# Patient Record
Sex: MALE | Race: BLACK OR AFRICAN AMERICAN | NOT HISPANIC OR LATINO | Employment: OTHER | URBAN - METROPOLITAN AREA
[De-identification: names, ages, dates, MRNs, and addresses within clinical notes are randomized per-mention and may not be internally consistent; named-entity substitution may affect disease eponyms.]

---

## 2019-07-08 PROCEDURE — 99285 EMERGENCY DEPT VISIT HI MDM: CPT

## 2019-07-08 PROCEDURE — 36415 COLL VENOUS BLD VENIPUNCTURE: CPT

## 2019-07-09 ENCOUNTER — APPOINTMENT (OUTPATIENT)
Dept: CT IMAGING | Facility: HOSPITAL | Age: 54
End: 2019-07-09

## 2019-07-09 ENCOUNTER — HOSPITAL ENCOUNTER (INPATIENT)
Facility: HOSPITAL | Age: 54
LOS: 7 days | Discharge: HOME | End: 2019-07-16
Attending: EMERGENCY MEDICINE | Admitting: SURGERY

## 2019-07-09 ENCOUNTER — ANESTHESIA EVENT (OUTPATIENT)
Dept: PERIOP | Facility: HOSPITAL | Age: 54
End: 2019-07-09

## 2019-07-09 ENCOUNTER — ANESTHESIA (OUTPATIENT)
Dept: PERIOP | Facility: HOSPITAL | Age: 54
End: 2019-07-09

## 2019-07-09 DIAGNOSIS — K43.6 INCARCERATED VENTRAL HERNIA: Primary | ICD-10-CM

## 2019-07-09 DIAGNOSIS — K56.609 SMALL BOWEL OBSTRUCTION (HCC): ICD-10-CM

## 2019-07-09 LAB
ALBUMIN SERPL-MCNC: 4.3 G/DL (ref 3.5–5.2)
ALBUMIN/GLOB SERPL: 1.1 G/DL
ALP SERPL-CCNC: 58 U/L (ref 39–117)
ALT SERPL W P-5'-P-CCNC: 19 U/L (ref 1–41)
ANION GAP SERPL CALCULATED.3IONS-SCNC: 15.2 MMOL/L (ref 5–15)
AST SERPL-CCNC: 23 U/L (ref 1–40)
BACTERIA UR QL AUTO: NORMAL /HPF
BASOPHILS # BLD AUTO: 0.04 10*3/MM3 (ref 0–0.2)
BASOPHILS NFR BLD AUTO: 0.3 % (ref 0–1.5)
BILIRUB SERPL-MCNC: 0.5 MG/DL (ref 0.2–1.2)
BILIRUB UR QL STRIP: NEGATIVE
BUN BLD-MCNC: 18 MG/DL (ref 6–20)
BUN/CREAT SERPL: 14 (ref 7–25)
CALCIUM SPEC-SCNC: 9.2 MG/DL (ref 8.6–10.5)
CHLORIDE SERPL-SCNC: 103 MMOL/L (ref 98–107)
CLARITY UR: CLEAR
CO2 SERPL-SCNC: 19.8 MMOL/L (ref 22–29)
COLOR UR: YELLOW
CREAT BLD-MCNC: 1.29 MG/DL (ref 0.76–1.27)
DEPRECATED RDW RBC AUTO: 42.1 FL (ref 37–54)
EOSINOPHIL # BLD AUTO: 0.13 10*3/MM3 (ref 0–0.4)
EOSINOPHIL NFR BLD AUTO: 1 % (ref 0.3–6.2)
ERYTHROCYTE [DISTWIDTH] IN BLOOD BY AUTOMATED COUNT: 14.7 % (ref 12.3–15.4)
GFR SERPL CREATININE-BSD FRML MDRD: 71 ML/MIN/1.73
GLOBULIN UR ELPH-MCNC: 3.9 GM/DL
GLUCOSE BLD-MCNC: 231 MG/DL (ref 65–99)
GLUCOSE BLDC GLUCOMTR-MCNC: 175 MG/DL (ref 70–130)
GLUCOSE BLDC GLUCOMTR-MCNC: 201 MG/DL (ref 70–130)
GLUCOSE BLDC GLUCOMTR-MCNC: 202 MG/DL (ref 70–130)
GLUCOSE BLDC GLUCOMTR-MCNC: 218 MG/DL (ref 70–130)
GLUCOSE BLDC GLUCOMTR-MCNC: 230 MG/DL (ref 70–130)
GLUCOSE BLDC GLUCOMTR-MCNC: 252 MG/DL (ref 70–130)
GLUCOSE UR STRIP-MCNC: ABNORMAL MG/DL
HCT VFR BLD AUTO: 38.8 % (ref 37.5–51)
HGB BLD-MCNC: 11.9 G/DL (ref 13–17.7)
HGB UR QL STRIP.AUTO: ABNORMAL
HYALINE CASTS UR QL AUTO: NORMAL /LPF
IMM GRANULOCYTES # BLD AUTO: 0.07 10*3/MM3 (ref 0–0.05)
IMM GRANULOCYTES NFR BLD AUTO: 0.5 % (ref 0–0.5)
KETONES UR QL STRIP: ABNORMAL
LEUKOCYTE ESTERASE UR QL STRIP.AUTO: ABNORMAL
LIPASE SERPL-CCNC: 24 U/L (ref 13–60)
LYMPHOCYTES # BLD AUTO: 2.45 10*3/MM3 (ref 0.7–3.1)
LYMPHOCYTES NFR BLD AUTO: 18.4 % (ref 19.6–45.3)
MCH RBC QN AUTO: 24.4 PG (ref 26.6–33)
MCHC RBC AUTO-ENTMCNC: 30.7 G/DL (ref 31.5–35.7)
MCV RBC AUTO: 79.7 FL (ref 79–97)
MONOCYTES # BLD AUTO: 0.94 10*3/MM3 (ref 0.1–0.9)
MONOCYTES NFR BLD AUTO: 7 % (ref 5–12)
NEUTROPHILS # BLD AUTO: 9.71 10*3/MM3 (ref 1.7–7)
NEUTROPHILS NFR BLD AUTO: 72.8 % (ref 42.7–76)
NITRITE UR QL STRIP: NEGATIVE
NRBC BLD AUTO-RTO: 0 /100 WBC (ref 0–0.2)
PH UR STRIP.AUTO: 5 [PH] (ref 5–8)
PLATELET # BLD AUTO: 275 10*3/MM3 (ref 140–450)
PMV BLD AUTO: 10.1 FL (ref 6–12)
POTASSIUM BLD-SCNC: 3.5 MMOL/L (ref 3.5–5.2)
PROT SERPL-MCNC: 8.2 G/DL (ref 6–8.5)
PROT UR QL STRIP: ABNORMAL
RBC # BLD AUTO: 4.87 10*6/MM3 (ref 4.14–5.8)
RBC # UR: NORMAL /HPF
REF LAB TEST METHOD: NORMAL
SODIUM BLD-SCNC: 138 MMOL/L (ref 136–145)
SP GR UR STRIP: 1.02 (ref 1–1.03)
SQUAMOUS #/AREA URNS HPF: NORMAL /HPF
UROBILINOGEN UR QL STRIP: ABNORMAL
WBC NRBC COR # BLD: 13.34 10*3/MM3 (ref 3.4–10.8)
WBC UR QL AUTO: NORMAL /HPF

## 2019-07-09 PROCEDURE — 88302 TISSUE EXAM BY PATHOLOGIST: CPT | Performed by: SURGERY

## 2019-07-09 PROCEDURE — 25010000002 CEFAZOLIN PER 500 MG: Performed by: SURGERY

## 2019-07-09 PROCEDURE — 25010000002 PROPOFOL 10 MG/ML EMULSION: Performed by: NURSE ANESTHETIST, CERTIFIED REGISTERED

## 2019-07-09 PROCEDURE — 93005 ELECTROCARDIOGRAM TRACING: CPT | Performed by: SURGERY

## 2019-07-09 PROCEDURE — 49561 PR REPAIR INCISIONAL HERNIA,STRANG: CPT | Performed by: SURGERY

## 2019-07-09 PROCEDURE — 25010000002 ONDANSETRON PER 1 MG: Performed by: EMERGENCY MEDICINE

## 2019-07-09 PROCEDURE — 82962 GLUCOSE BLOOD TEST: CPT

## 2019-07-09 PROCEDURE — 63710000001 INSULIN LISPRO (HUMAN) PER 5 UNITS: Performed by: SURGERY

## 2019-07-09 PROCEDURE — 25010000002 SUCCINYLCHOLINE PER 20 MG: Performed by: NURSE ANESTHETIST, CERTIFIED REGISTERED

## 2019-07-09 PROCEDURE — 74176 CT ABD & PELVIS W/O CONTRAST: CPT

## 2019-07-09 PROCEDURE — 63710000001 INSULIN GLARGINE PER 5 UNITS: Performed by: INTERNAL MEDICINE

## 2019-07-09 PROCEDURE — 25010000002 HYDROMORPHONE PER 4 MG: Performed by: SURGERY

## 2019-07-09 PROCEDURE — 25010000002 ALBUMIN HUMAN 5% PER 50 ML: Performed by: NURSE ANESTHETIST, CERTIFIED REGISTERED

## 2019-07-09 PROCEDURE — 25010000002 HYDROMORPHONE 1 MG/ML SOLUTION: Performed by: EMERGENCY MEDICINE

## 2019-07-09 PROCEDURE — 25010000002 PHENYLEPHRINE PER 1 ML: Performed by: NURSE ANESTHETIST, CERTIFIED REGISTERED

## 2019-07-09 PROCEDURE — 44120 REMOVAL OF SMALL INTESTINE: CPT | Performed by: SURGERY

## 2019-07-09 PROCEDURE — 49561 PR REPAIR INCISIONAL HERNIA,STRANG: CPT | Performed by: SPECIALIST/TECHNOLOGIST, OTHER

## 2019-07-09 PROCEDURE — 25010000002 FENTANYL CITRATE (PF) 100 MCG/2ML SOLUTION: Performed by: NURSE ANESTHETIST, CERTIFIED REGISTERED

## 2019-07-09 PROCEDURE — P9041 ALBUMIN (HUMAN),5%, 50ML: HCPCS | Performed by: NURSE ANESTHETIST, CERTIFIED REGISTERED

## 2019-07-09 PROCEDURE — 80053 COMPREHEN METABOLIC PANEL: CPT | Performed by: EMERGENCY MEDICINE

## 2019-07-09 PROCEDURE — 25010000002 ONDANSETRON PER 1 MG: Performed by: SURGERY

## 2019-07-09 PROCEDURE — 93010 ELECTROCARDIOGRAM REPORT: CPT | Performed by: INTERNAL MEDICINE

## 2019-07-09 PROCEDURE — C1781 MESH (IMPLANTABLE): HCPCS | Performed by: SURGERY

## 2019-07-09 PROCEDURE — 88307 TISSUE EXAM BY PATHOLOGIST: CPT | Performed by: SURGERY

## 2019-07-09 PROCEDURE — 49568 PR IMPLANT MESH HERNIA REPAIR/DEBRIDEMENT CLOSURE: CPT | Performed by: SPECIALIST/TECHNOLOGIST, OTHER

## 2019-07-09 PROCEDURE — 81001 URINALYSIS AUTO W/SCOPE: CPT | Performed by: EMERGENCY MEDICINE

## 2019-07-09 PROCEDURE — 94799 UNLISTED PULMONARY SVC/PX: CPT

## 2019-07-09 PROCEDURE — 85025 COMPLETE CBC W/AUTO DIFF WBC: CPT | Performed by: EMERGENCY MEDICINE

## 2019-07-09 PROCEDURE — 0DB80ZZ EXCISION OF SMALL INTESTINE, OPEN APPROACH: ICD-10-PCS | Performed by: SURGERY

## 2019-07-09 PROCEDURE — 44120 REMOVAL OF SMALL INTESTINE: CPT | Performed by: SPECIALIST/TECHNOLOGIST, OTHER

## 2019-07-09 PROCEDURE — 25010000002 ONDANSETRON PER 1 MG: Performed by: NURSE ANESTHETIST, CERTIFIED REGISTERED

## 2019-07-09 PROCEDURE — 25010000002 HYDROMORPHONE PER 4 MG: Performed by: NURSE ANESTHETIST, CERTIFIED REGISTERED

## 2019-07-09 PROCEDURE — 94660 CPAP INITIATION&MGMT: CPT

## 2019-07-09 PROCEDURE — 49568 PR IMPLANT MESH HERNIA REPAIR/DEBRIDEMENT CLOSURE: CPT | Performed by: SURGERY

## 2019-07-09 PROCEDURE — 0WUF0JZ SUPPLEMENT ABDOMINAL WALL WITH SYNTHETIC SUBSTITUTE, OPEN APPROACH: ICD-10-PCS | Performed by: SURGERY

## 2019-07-09 PROCEDURE — 99223 1ST HOSP IP/OBS HIGH 75: CPT | Performed by: SURGERY

## 2019-07-09 PROCEDURE — 83690 ASSAY OF LIPASE: CPT | Performed by: EMERGENCY MEDICINE

## 2019-07-09 DEVICE — PROXIMATE LINEAR CUTTER RELOAD, BLUE, 75MM
Type: IMPLANTABLE DEVICE | Site: ABDOMEN | Status: FUNCTIONAL
Brand: PROXIMATE

## 2019-07-09 DEVICE — MESH TISS REINFORCEMENT BIO/A 20X20: Type: IMPLANTABLE DEVICE | Site: ABDOMEN | Status: FUNCTIONAL

## 2019-07-09 DEVICE — CLIP LIGAT VASC HORIZON TI LG ORNG 6CT: Type: IMPLANTABLE DEVICE | Site: ABDOMEN | Status: FUNCTIONAL

## 2019-07-09 RX ORDER — ONDANSETRON 2 MG/ML
4 INJECTION INTRAMUSCULAR; INTRAVENOUS EVERY 6 HOURS PRN
Status: DISCONTINUED | OUTPATIENT
Start: 2019-07-09 | End: 2019-07-16 | Stop reason: HOSPADM

## 2019-07-09 RX ORDER — FAMOTIDINE 10 MG/ML
20 INJECTION, SOLUTION INTRAVENOUS ONCE
Status: COMPLETED | OUTPATIENT
Start: 2019-07-09 | End: 2019-07-09

## 2019-07-09 RX ORDER — PROMETHAZINE HYDROCHLORIDE 25 MG/ML
12.5 INJECTION, SOLUTION INTRAMUSCULAR; INTRAVENOUS ONCE AS NEEDED
Status: DISCONTINUED | OUTPATIENT
Start: 2019-07-09 | End: 2019-07-09 | Stop reason: HOSPADM

## 2019-07-09 RX ORDER — ONDANSETRON 2 MG/ML
4 INJECTION INTRAMUSCULAR; INTRAVENOUS ONCE AS NEEDED
Status: COMPLETED | OUTPATIENT
Start: 2019-07-09 | End: 2019-07-09

## 2019-07-09 RX ORDER — ALBUMIN, HUMAN INJ 5% 5 %
SOLUTION INTRAVENOUS CONTINUOUS PRN
Status: DISCONTINUED | OUTPATIENT
Start: 2019-07-09 | End: 2019-07-09 | Stop reason: SURG

## 2019-07-09 RX ORDER — ACETAMINOPHEN 325 MG/1
650 TABLET ORAL ONCE AS NEEDED
Status: DISCONTINUED | OUTPATIENT
Start: 2019-07-09 | End: 2019-07-09 | Stop reason: HOSPADM

## 2019-07-09 RX ORDER — CEFAZOLIN SODIUM IN 0.9 % NACL 3 G/100 ML
3 INTRAVENOUS SOLUTION, PIGGYBACK (ML) INTRAVENOUS EVERY 8 HOURS
Status: COMPLETED | OUTPATIENT
Start: 2019-07-09 | End: 2019-07-10

## 2019-07-09 RX ORDER — HYDROCODONE BITARTRATE AND ACETAMINOPHEN 7.5; 325 MG/1; MG/1
1 TABLET ORAL ONCE AS NEEDED
Status: DISCONTINUED | OUTPATIENT
Start: 2019-07-09 | End: 2019-07-09 | Stop reason: HOSPADM

## 2019-07-09 RX ORDER — PROMETHAZINE HYDROCHLORIDE 25 MG/1
25 SUPPOSITORY RECTAL ONCE AS NEEDED
Status: DISCONTINUED | OUTPATIENT
Start: 2019-07-09 | End: 2019-07-09 | Stop reason: HOSPADM

## 2019-07-09 RX ORDER — SODIUM CHLORIDE 9 MG/ML
100 INJECTION, SOLUTION INTRAVENOUS CONTINUOUS
Status: DISCONTINUED | OUTPATIENT
Start: 2019-07-09 | End: 2019-07-09 | Stop reason: HOSPADM

## 2019-07-09 RX ORDER — FENTANYL CITRATE 50 UG/ML
INJECTION, SOLUTION INTRAMUSCULAR; INTRAVENOUS AS NEEDED
Status: DISCONTINUED | OUTPATIENT
Start: 2019-07-09 | End: 2019-07-09 | Stop reason: SURG

## 2019-07-09 RX ORDER — HYDROMORPHONE HYDROCHLORIDE 1 MG/ML
0.5 INJECTION, SOLUTION INTRAMUSCULAR; INTRAVENOUS; SUBCUTANEOUS
Status: DISCONTINUED | OUTPATIENT
Start: 2019-07-09 | End: 2019-07-09 | Stop reason: HOSPADM

## 2019-07-09 RX ORDER — DEXTROSE MONOHYDRATE 25 G/50ML
25 INJECTION, SOLUTION INTRAVENOUS
Status: DISCONTINUED | OUTPATIENT
Start: 2019-07-09 | End: 2019-07-16 | Stop reason: HOSPADM

## 2019-07-09 RX ORDER — SODIUM CHLORIDE, SODIUM LACTATE, POTASSIUM CHLORIDE, CALCIUM CHLORIDE 600; 310; 30; 20 MG/100ML; MG/100ML; MG/100ML; MG/100ML
9 INJECTION, SOLUTION INTRAVENOUS CONTINUOUS
Status: DISCONTINUED | OUTPATIENT
Start: 2019-07-09 | End: 2019-07-09 | Stop reason: HOSPADM

## 2019-07-09 RX ORDER — POTASSIUM CHLORIDE 750 MG/1
40 CAPSULE, EXTENDED RELEASE ORAL AS NEEDED
Status: DISCONTINUED | OUTPATIENT
Start: 2019-07-09 | End: 2019-07-12

## 2019-07-09 RX ORDER — SODIUM CHLORIDE 0.9 % (FLUSH) 0.9 %
10 SYRINGE (ML) INJECTION AS NEEDED
Status: DISCONTINUED | OUTPATIENT
Start: 2019-07-09 | End: 2019-07-09 | Stop reason: HOSPADM

## 2019-07-09 RX ORDER — LIDOCAINE HYDROCHLORIDE 20 MG/ML
INJECTION, SOLUTION INFILTRATION; PERINEURAL AS NEEDED
Status: DISCONTINUED | OUTPATIENT
Start: 2019-07-09 | End: 2019-07-09 | Stop reason: SURG

## 2019-07-09 RX ORDER — PROMETHAZINE HYDROCHLORIDE 25 MG/1
25 TABLET ORAL ONCE AS NEEDED
Status: DISCONTINUED | OUTPATIENT
Start: 2019-07-09 | End: 2019-07-09 | Stop reason: HOSPADM

## 2019-07-09 RX ORDER — DIPHENHYDRAMINE HYDROCHLORIDE 50 MG/ML
12.5 INJECTION INTRAMUSCULAR; INTRAVENOUS
Status: DISCONTINUED | OUTPATIENT
Start: 2019-07-09 | End: 2019-07-09 | Stop reason: HOSPADM

## 2019-07-09 RX ORDER — LABETALOL HYDROCHLORIDE 5 MG/ML
5 INJECTION, SOLUTION INTRAVENOUS
Status: DISCONTINUED | OUTPATIENT
Start: 2019-07-09 | End: 2019-07-09 | Stop reason: HOSPADM

## 2019-07-09 RX ORDER — FLUMAZENIL 0.1 MG/ML
0.2 INJECTION INTRAVENOUS AS NEEDED
Status: DISCONTINUED | OUTPATIENT
Start: 2019-07-09 | End: 2019-07-09 | Stop reason: HOSPADM

## 2019-07-09 RX ORDER — PROPOFOL 10 MG/ML
VIAL (ML) INTRAVENOUS AS NEEDED
Status: DISCONTINUED | OUTPATIENT
Start: 2019-07-09 | End: 2019-07-09 | Stop reason: SURG

## 2019-07-09 RX ORDER — AMLODIPINE BESYLATE 10 MG/1
10 TABLET ORAL DAILY
COMMUNITY

## 2019-07-09 RX ORDER — ROCURONIUM BROMIDE 10 MG/ML
INJECTION, SOLUTION INTRAVENOUS AS NEEDED
Status: DISCONTINUED | OUTPATIENT
Start: 2019-07-09 | End: 2019-07-09 | Stop reason: SURG

## 2019-07-09 RX ORDER — LIDOCAINE HYDROCHLORIDE 10 MG/ML
0.5 INJECTION, SOLUTION EPIDURAL; INFILTRATION; INTRACAUDAL; PERINEURAL ONCE AS NEEDED
Status: DISCONTINUED | OUTPATIENT
Start: 2019-07-09 | End: 2019-07-09 | Stop reason: HOSPADM

## 2019-07-09 RX ORDER — AMLODIPINE BESYLATE 10 MG/1
10 TABLET ORAL DAILY
Status: DISCONTINUED | OUTPATIENT
Start: 2019-07-09 | End: 2019-07-16 | Stop reason: HOSPADM

## 2019-07-09 RX ORDER — DIPHENHYDRAMINE HCL 25 MG
25 CAPSULE ORAL
Status: DISCONTINUED | OUTPATIENT
Start: 2019-07-09 | End: 2019-07-09 | Stop reason: HOSPADM

## 2019-07-09 RX ORDER — POTASSIUM CHLORIDE 1.5 G/1.77G
40 POWDER, FOR SOLUTION ORAL AS NEEDED
Status: DISCONTINUED | OUTPATIENT
Start: 2019-07-09 | End: 2019-07-12

## 2019-07-09 RX ORDER — LABETALOL HYDROCHLORIDE 5 MG/ML
10 INJECTION, SOLUTION INTRAVENOUS EVERY 4 HOURS PRN
Status: DISCONTINUED | OUTPATIENT
Start: 2019-07-09 | End: 2019-07-16 | Stop reason: HOSPADM

## 2019-07-09 RX ORDER — HYDROMORPHONE HYDROCHLORIDE 1 MG/ML
0.5 INJECTION, SOLUTION INTRAMUSCULAR; INTRAVENOUS; SUBCUTANEOUS
Status: DISCONTINUED | OUTPATIENT
Start: 2019-07-09 | End: 2019-07-16 | Stop reason: HOSPADM

## 2019-07-09 RX ORDER — ONDANSETRON 2 MG/ML
4 INJECTION INTRAMUSCULAR; INTRAVENOUS ONCE
Status: COMPLETED | OUTPATIENT
Start: 2019-07-09 | End: 2019-07-09

## 2019-07-09 RX ORDER — FENTANYL CITRATE 50 UG/ML
50 INJECTION, SOLUTION INTRAMUSCULAR; INTRAVENOUS
Status: DISCONTINUED | OUTPATIENT
Start: 2019-07-09 | End: 2019-07-09 | Stop reason: HOSPADM

## 2019-07-09 RX ORDER — CEFAZOLIN SODIUM IN 0.9 % NACL 3 G/100 ML
3 INTRAVENOUS SOLUTION, PIGGYBACK (ML) INTRAVENOUS ONCE
Status: COMPLETED | OUTPATIENT
Start: 2019-07-09 | End: 2019-07-09

## 2019-07-09 RX ORDER — NALOXONE HCL 0.4 MG/ML
0.1 VIAL (ML) INJECTION
Status: DISCONTINUED | OUTPATIENT
Start: 2019-07-09 | End: 2019-07-16 | Stop reason: HOSPADM

## 2019-07-09 RX ORDER — HYDRALAZINE HYDROCHLORIDE 20 MG/ML
5 INJECTION INTRAMUSCULAR; INTRAVENOUS
Status: DISCONTINUED | OUTPATIENT
Start: 2019-07-09 | End: 2019-07-09 | Stop reason: HOSPADM

## 2019-07-09 RX ORDER — NICOTINE POLACRILEX 4 MG
15 LOZENGE BUCCAL
Status: DISCONTINUED | OUTPATIENT
Start: 2019-07-09 | End: 2019-07-16 | Stop reason: HOSPADM

## 2019-07-09 RX ORDER — INSULIN GLARGINE 100 [IU]/ML
10 INJECTION, SOLUTION SUBCUTANEOUS NIGHTLY
Status: DISCONTINUED | OUTPATIENT
Start: 2019-07-09 | End: 2019-07-13

## 2019-07-09 RX ORDER — HYDROMORPHONE HCL 110MG/55ML
PATIENT CONTROLLED ANALGESIA SYRINGE INTRAVENOUS AS NEEDED
Status: DISCONTINUED | OUTPATIENT
Start: 2019-07-09 | End: 2019-07-09 | Stop reason: SURG

## 2019-07-09 RX ORDER — POTASSIUM CHLORIDE 29.8 MG/ML
20 INJECTION INTRAVENOUS
Status: DISCONTINUED | OUTPATIENT
Start: 2019-07-09 | End: 2019-07-12

## 2019-07-09 RX ORDER — SUCCINYLCHOLINE CHLORIDE 20 MG/ML
INJECTION INTRAMUSCULAR; INTRAVENOUS AS NEEDED
Status: DISCONTINUED | OUTPATIENT
Start: 2019-07-09 | End: 2019-07-09 | Stop reason: SURG

## 2019-07-09 RX ORDER — SODIUM CHLORIDE 9 MG/ML
75 INJECTION, SOLUTION INTRAVENOUS CONTINUOUS
Status: DISCONTINUED | OUTPATIENT
Start: 2019-07-09 | End: 2019-07-12

## 2019-07-09 RX ORDER — OXYCODONE AND ACETAMINOPHEN 7.5; 325 MG/1; MG/1
1 TABLET ORAL ONCE AS NEEDED
Status: DISCONTINUED | OUTPATIENT
Start: 2019-07-09 | End: 2019-07-09 | Stop reason: HOSPADM

## 2019-07-09 RX ORDER — PROMETHAZINE HYDROCHLORIDE 25 MG/ML
6.25 INJECTION, SOLUTION INTRAMUSCULAR; INTRAVENOUS
Status: DISCONTINUED | OUTPATIENT
Start: 2019-07-09 | End: 2019-07-09 | Stop reason: HOSPADM

## 2019-07-09 RX ORDER — ONDANSETRON 2 MG/ML
4 INJECTION INTRAMUSCULAR; INTRAVENOUS EVERY 6 HOURS PRN
Status: DISCONTINUED | OUTPATIENT
Start: 2019-07-09 | End: 2019-07-09 | Stop reason: HOSPADM

## 2019-07-09 RX ORDER — NALOXONE HCL 0.4 MG/ML
0.2 VIAL (ML) INJECTION AS NEEDED
Status: DISCONTINUED | OUTPATIENT
Start: 2019-07-09 | End: 2019-07-09 | Stop reason: HOSPADM

## 2019-07-09 RX ORDER — SODIUM CHLORIDE 0.9 % (FLUSH) 0.9 %
1-10 SYRINGE (ML) INJECTION AS NEEDED
Status: DISCONTINUED | OUTPATIENT
Start: 2019-07-09 | End: 2019-07-09 | Stop reason: HOSPADM

## 2019-07-09 RX ORDER — EPHEDRINE SULFATE 50 MG/ML
5 INJECTION, SOLUTION INTRAVENOUS ONCE AS NEEDED
Status: DISCONTINUED | OUTPATIENT
Start: 2019-07-09 | End: 2019-07-09 | Stop reason: HOSPADM

## 2019-07-09 RX ADMIN — AMLODIPINE BESYLATE 10 MG: 10 TABLET ORAL at 15:24

## 2019-07-09 RX ADMIN — HYDROMORPHONE HYDROCHLORIDE 1 MG: 1 INJECTION, SOLUTION INTRAMUSCULAR; INTRAVENOUS; SUBCUTANEOUS at 00:24

## 2019-07-09 RX ADMIN — PHENYLEPHRINE HYDROCHLORIDE 0.5 MCG/KG/MIN: 10 INJECTION, SOLUTION INTRAMUSCULAR; INTRAVENOUS; SUBCUTANEOUS at 10:20

## 2019-07-09 RX ADMIN — SODIUM CHLORIDE 100 ML/HR: 9 INJECTION, SOLUTION INTRAVENOUS at 03:55

## 2019-07-09 RX ADMIN — ONDANSETRON 4 MG: 2 INJECTION INTRAMUSCULAR; INTRAVENOUS at 01:18

## 2019-07-09 RX ADMIN — PHENYLEPHRINE HYDROCHLORIDE 100 MCG: 10 INJECTION INTRAVENOUS at 10:15

## 2019-07-09 RX ADMIN — SUGAMMADEX 600 MG: 100 INJECTION, SOLUTION INTRAVENOUS at 11:12

## 2019-07-09 RX ADMIN — INSULIN LISPRO 5 UNITS: 100 INJECTION, SOLUTION INTRAVENOUS; SUBCUTANEOUS at 21:00

## 2019-07-09 RX ADMIN — SODIUM CHLORIDE 3 G: 9 INJECTION, SOLUTION INTRAVENOUS at 23:46

## 2019-07-09 RX ADMIN — SUCCINYLCHOLINE CHLORIDE 300 MG: 20 INJECTION, SOLUTION INTRAMUSCULAR; INTRAVENOUS; PARENTERAL at 09:27

## 2019-07-09 RX ADMIN — ALBUMIN (HUMAN): 0.05 SOLUTION INTRAVENOUS at 10:05

## 2019-07-09 RX ADMIN — CEFAZOLIN 3 G: 1 INJECTION, POWDER, FOR SOLUTION INTRAMUSCULAR; INTRAVENOUS; PARENTERAL at 09:34

## 2019-07-09 RX ADMIN — FAMOTIDINE 20 MG: 10 INJECTION INTRAVENOUS at 08:21

## 2019-07-09 RX ADMIN — HYDROMORPHONE HYDROCHLORIDE 0.5 MG: 1 INJECTION, SOLUTION INTRAMUSCULAR; INTRAVENOUS; SUBCUTANEOUS at 21:00

## 2019-07-09 RX ADMIN — LIDOCAINE HYDROCHLORIDE 100 MG: 20 INJECTION, SOLUTION INFILTRATION; PERINEURAL at 09:27

## 2019-07-09 RX ADMIN — SODIUM CHLORIDE 1000 ML: 9 INJECTION, SOLUTION INTRAVENOUS at 00:23

## 2019-07-09 RX ADMIN — ONDANSETRON 4 MG: 2 INJECTION INTRAMUSCULAR; INTRAVENOUS at 00:24

## 2019-07-09 RX ADMIN — FENTANYL CITRATE 50 MCG: 50 INJECTION INTRAMUSCULAR; INTRAVENOUS at 11:55

## 2019-07-09 RX ADMIN — SODIUM CHLORIDE, POTASSIUM CHLORIDE, SODIUM LACTATE AND CALCIUM CHLORIDE 9 ML/HR: 600; 310; 30; 20 INJECTION, SOLUTION INTRAVENOUS at 08:21

## 2019-07-09 RX ADMIN — INSULIN GLARGINE 10 UNITS: 100 INJECTION, SOLUTION SUBCUTANEOUS at 21:00

## 2019-07-09 RX ADMIN — HYDROMORPHONE HYDROCHLORIDE 0.5 MG: 2 INJECTION INTRAMUSCULAR; INTRAVENOUS; SUBCUTANEOUS at 11:25

## 2019-07-09 RX ADMIN — HYDROMORPHONE HYDROCHLORIDE 0.5 MG: 1 INJECTION, SOLUTION INTRAMUSCULAR; INTRAVENOUS; SUBCUTANEOUS at 03:55

## 2019-07-09 RX ADMIN — HYDROMORPHONE HYDROCHLORIDE 0.5 MG: 1 INJECTION, SOLUTION INTRAMUSCULAR; INTRAVENOUS; SUBCUTANEOUS at 15:29

## 2019-07-09 RX ADMIN — ROCURONIUM BROMIDE 10 MG: 10 INJECTION INTRAVENOUS at 09:27

## 2019-07-09 RX ADMIN — FENTANYL CITRATE 50 MCG: 50 INJECTION, SOLUTION INTRAMUSCULAR; INTRAVENOUS at 10:55

## 2019-07-09 RX ADMIN — HYDROMORPHONE HYDROCHLORIDE 0.5 MG: 2 INJECTION INTRAMUSCULAR; INTRAVENOUS; SUBCUTANEOUS at 11:20

## 2019-07-09 RX ADMIN — PHENYLEPHRINE HYDROCHLORIDE 100 MCG: 10 INJECTION INTRAVENOUS at 10:05

## 2019-07-09 RX ADMIN — HYDROMORPHONE HYDROCHLORIDE 0.5 MG: 1 INJECTION, SOLUTION INTRAMUSCULAR; INTRAVENOUS; SUBCUTANEOUS at 18:22

## 2019-07-09 RX ADMIN — SODIUM CHLORIDE, POTASSIUM CHLORIDE, SODIUM LACTATE AND CALCIUM CHLORIDE 9 ML/HR: 600; 310; 30; 20 INJECTION, SOLUTION INTRAVENOUS at 12:31

## 2019-07-09 RX ADMIN — INSULIN LISPRO 5 UNITS: 100 INJECTION, SOLUTION INTRAVENOUS; SUBCUTANEOUS at 13:26

## 2019-07-09 RX ADMIN — PROPOFOL 300 MG: 10 INJECTION, EMULSION INTRAVENOUS at 09:27

## 2019-07-09 RX ADMIN — ONDANSETRON HYDROCHLORIDE 4 MG: 2 SOLUTION INTRAMUSCULAR; INTRAVENOUS at 03:55

## 2019-07-09 RX ADMIN — HYDROMORPHONE HYDROCHLORIDE 1 MG: 1 INJECTION, SOLUTION INTRAMUSCULAR; INTRAVENOUS; SUBCUTANEOUS at 01:19

## 2019-07-09 RX ADMIN — PHENYLEPHRINE HYDROCHLORIDE 100 MCG: 10 INJECTION INTRAVENOUS at 09:47

## 2019-07-09 RX ADMIN — ROCURONIUM BROMIDE 20 MG: 10 INJECTION INTRAVENOUS at 10:05

## 2019-07-09 RX ADMIN — SODIUM CHLORIDE 100 ML/HR: 9 INJECTION, SOLUTION INTRAVENOUS at 15:37

## 2019-07-09 RX ADMIN — ROCURONIUM BROMIDE 20 MG: 10 INJECTION INTRAVENOUS at 10:54

## 2019-07-09 RX ADMIN — FENTANYL CITRATE 100 MCG: 50 INJECTION, SOLUTION INTRAMUSCULAR; INTRAVENOUS at 09:27

## 2019-07-09 RX ADMIN — ROCURONIUM BROMIDE 40 MG: 10 INJECTION INTRAVENOUS at 09:30

## 2019-07-09 RX ADMIN — ONDANSETRON 4 MG: 2 INJECTION INTRAMUSCULAR; INTRAVENOUS at 12:07

## 2019-07-09 RX ADMIN — SODIUM CHLORIDE 3 G: 9 INJECTION, SOLUTION INTRAVENOUS at 16:36

## 2019-07-09 RX ADMIN — PHENYLEPHRINE HYDROCHLORIDE 100 MCG: 10 INJECTION INTRAVENOUS at 10:00

## 2019-07-09 RX ADMIN — FENTANYL CITRATE 100 MCG: 50 INJECTION, SOLUTION INTRAMUSCULAR; INTRAVENOUS at 09:40

## 2019-07-09 RX ADMIN — PHENYLEPHRINE HYDROCHLORIDE 100 MCG: 10 INJECTION INTRAVENOUS at 10:10

## 2019-07-09 RX ADMIN — INSULIN LISPRO 5 UNITS: 100 INJECTION, SOLUTION INTRAVENOUS; SUBCUTANEOUS at 18:21

## 2019-07-09 RX ADMIN — SODIUM CHLORIDE, POTASSIUM CHLORIDE, SODIUM LACTATE AND CALCIUM CHLORIDE: 600; 310; 30; 20 INJECTION, SOLUTION INTRAVENOUS at 10:30

## 2019-07-09 NOTE — ANESTHESIA PROCEDURE NOTES
Airway  Urgency: elective    Date/Time: 7/9/2019 9:28 AM  Airway not difficult    General Information and Staff    Patient location during procedure: OR  Anesthesiologist: Yan Em MD  CRNA: Navi Nielsen CRNA    Indications and Patient Condition  Indications for airway management: airway protection    Preoxygenated: yes  MILS maintained throughout  Mask difficulty assessment: 1 - vent by mask    Final Airway Details  Final airway type: endotracheal airway      Successful airway: ETT  Cuffed: yes   Successful intubation technique: video laryngoscopy  Facilitating devices/methods: intubating stylet  Endotracheal tube insertion site: oral  Blade: CMAC  Blade size: D  ETT size (mm): 8.0  Cormack-Lehane Classification: grade I - full view of glottis  Placement verified by: chest auscultation and capnometry   Measured from: teeth  ETT to teeth (cm): 22

## 2019-07-09 NOTE — PLAN OF CARE
Problem: Patient Care Overview  Goal: Plan of Care Review  Outcome: Ongoing (interventions implemented as appropriate)   07/09/19 1948   Plan of Care Review   Progress improving   OTHER   Outcome Summary Patient arrived to ICU from PACU at approx 1300. Midline abd dressing and JOHNNY x2 clean/dry/intact. Per patient, pain is well controlled with IV Dilaudid; denies n/v. Patient was moved to a bariatric bed, tolerated well. NG tube continues at ILWS. VSS. Will continue to monitor.   Coping/Psychosocial   Plan of Care Reviewed With patient       Problem: Pain, Acute (Adult)  Goal: Identify Related Risk Factors and Signs and Symptoms  Outcome: Ongoing (interventions implemented as appropriate)   07/09/19 1948   Pain, Acute (Adult)   Related Risk Factors (Acute Pain) positioning;surgery     Goal: Acceptable Pain Control/Comfort Level  Outcome: Ongoing (interventions implemented as appropriate)   07/09/19 1948   Pain, Acute (Adult)   Acceptable Pain Control/Comfort Level making progress toward outcome       Problem: Skin Injury Risk (Adult)  Goal: Identify Related Risk Factors and Signs and Symptoms  Outcome: Ongoing (interventions implemented as appropriate)   07/09/19 1948   Skin Injury Risk (Adult)   Related Risk Factors (Skin Injury Risk) body weight extremes;critical care admission;mobility impaired     Goal: Skin Health and Integrity  Outcome: Ongoing (interventions implemented as appropriate)   07/09/19 1948   Skin Injury Risk (Adult)   Skin Health and Integrity making progress toward outcome       Problem: Fall Risk (Adult)  Goal: Identify Related Risk Factors and Signs and Symptoms  Outcome: Ongoing (interventions implemented as appropriate)   07/09/19 1948   Fall Risk (Adult)   Related Risk Factors (Fall Risk) sleep pattern alteration;slippery/uneven surfaces;environment unfamiliar   Signs and Symptoms (Fall Risk) presence of risk factors     Goal: Absence of Fall  Outcome: Ongoing (interventions implemented as  appropriate)   07/09/19 1948   Fall Risk (Adult)   Absence of Fall making progress toward outcome

## 2019-07-09 NOTE — PLAN OF CARE
Problem: Patient Care Overview  Goal: Plan of Care Review  Continue symptom management and comfort care   07/09/19 0330 07/09/19 0433   Plan of Care Review   Progress --  no change   OTHER   Outcome Summary --  Patient arrived onto unit from ER in extreme pain. Order obtained for Dilaudid and Zofran which were given with good results. Plan is for surgery in the morning. Pt is a  and not from [place]. NG tube inserted in ED now on ILWS. Will continue to monitor.   Coping/Psychosocial   Plan of Care Reviewed With patient --

## 2019-07-09 NOTE — ANESTHESIA POSTPROCEDURE EVALUATION
"Patient: Jayce Winkler    Procedure Summary     Date:  07/09/19 Room / Location:  [facility] OR 09 / [facility] MAIN OR    Anesthesia Start:  0916 Anesthesia Stop:  1138    Procedure:  open incarcerated ventral hernia repair with mesh and small bowel resection (N/A Abdomen) Diagnosis:      Surgeon:  Andrea Mckinnon MD Provider:  Yan Em MD    Anesthesia Type:  general ASA Status:  3          Anesthesia Type: general  Last vitals  BP   178/95 (07/09/19 1205)   Temp   36.9 °C (98.5 °F) (07/09/19 1145)   Pulse   117 (07/09/19 1205)   Resp   14 (07/09/19 1205)     SpO2   99 % (07/09/19 1205)     Post Anesthesia Care and Evaluation    Patient location during evaluation: bedside  Patient participation: complete - patient participated  Level of consciousness: awake and alert  Pain management: adequate  Airway patency: patent  Anesthetic complications: No anesthetic complications    Cardiovascular status: acceptable  Respiratory status: acceptable  Hydration status: acceptable    Comments: /95   Pulse 117   Temp 36.9 °C (98.5 °F) (Oral)   Resp 14   Ht 188 cm (74\")   Wt (!) 184 kg (406 lb 3.2 oz)   SpO2 99%   BMI 52.15 kg/m²       "

## 2019-07-09 NOTE — ED NOTES
Pt given urinal to try to attempt to get a urine sample.     Estefania Burgess, RN  07/09/19 0135

## 2019-07-09 NOTE — H&P
SUMMARY (A/P):    53-year-old gentleman with large chronic now incarcerated ventral hernia with significant tenderness concerning for impending strangulation.  I recommended emergency surgical intervention.  He understands the rationale for the procedure, the nature of the procedure, and the risks including but not limited to bleeding, infection, use of mesh, and recurrence of hernia.  He also understands that with hypertension, diabetes, and BMI of 52, his risks are markedly increased.      CC:    Hernia    HPI:    53-year-old gentleman whose had a known ventral hernia for many years who last night developed sudden severe onset of pain in the central abdomen associated with large palpable and visible bulge that became much firmer than usual.    PSH:    Parathyroidectomy    PMH:    Diabetes, on Glucophage  Hypertension, on Norvasc  Morbid obesity    FAMILY HISTORY:    Negative for colorectal cancer    SOCIAL HISTORY:   Denies tobacco use  Occasional alcohol use    ALLERGIES: reviewed, in Epic    MEDICATIONS: reviewed, in Epic    ROS:  No chest pain or shortness of air.  All other systems reviewed and negative other than presenting complaints.    RADIOLOGY/ENDOSCOPY:    CT abdomen pelvis shows large ventral hernia with significant amount of small bowel contained within and evidence of obstruction consistent with incarceration    LABS:    Glucose 231  GFR 71  WBC 13  Hemoglobin 11.9  Platelets 275    PHYSICAL EXAM:   Constitutional: Morbidly obese, in significant discomfort   Vital signs: /107, HR 96, T 98.2, weight 406 pounds, height 74 inches, BMI 52.1  Eyes: Conjunctiva normal, sclera nonicteric  ENMT: Hearing grossly normal, oral mucosa moist  Neck: Supple, no palpable mass, trachea midline  Respiratory: Clear to auscultation, normal inspiratory effort  Cardiovascular: Regular rate, no murmur, no carotid bruit, no peripheral edema, no jugular venous distention  Gastrointestinal: Soft except in the  periumbilical region where he has a massive ventral hernia that is firm, tender, and not reducible  Lymphatics (palpable nodes):  cervical-negative, inguinal-negative  Skin:  Warm, dry, no rash on visualized skin surfaces  Musculoskeletal: Symmetric strength, no atrophy  Psychiatric: Alert and oriented ×3, normal affect     GINGER SAEED M.D.

## 2019-07-09 NOTE — NURSING NOTE
Patient accidentally pulled out his IV access.  IV therapy paged.  Vitals were taken and BP was 201/107.  Notified Dr Mckinnon.  He is on his way to see patient and will address at that time.  Stat call made to IV therapy for new access.

## 2019-07-09 NOTE — PROGRESS NOTES
Discharge Planning Assessment  [facility]     Patient Name: Jayce Winkler  MRN: [id number]  Today's Date: 7/9/2019    Admit Date: 7/9/2019    Discharge Needs Assessment    No documentation.       Discharge Plan     Row Name 07/09/19 1756       Plan    Plan Comments  The patient was transferred to [facility] from ER on 7/9/19. The patient went to surgery and then was transferred to ICU after surgery. CCP will screen and follow for any needs that may arise. KAREN Casey RN, CCP.         Destination      No service coordination in this encounter.      Durable Medical Equipment      No service coordination in this encounter.      Dialysis/Infusion      No service coordination in this encounter.      Home Medical Care      No service coordination in this encounter.      Therapy      No service coordination in this encounter.      Community Resources      No service coordination in this encounter.          Demographic Summary    No documentation.       Functional Status    No documentation.       Psychosocial    No documentation.       Abuse/Neglect    No documentation.       Legal    No documentation.       Substance Abuse    No documentation.       Patient Forms    No documentation.           Katina Casey RN

## 2019-07-09 NOTE — CONSULTS
Referring Provider: Dr. Mckinnon  Reason for Consultation: ICU care    Patient Care Team:  Provider, No Known as PCP - General    Chief complaint:   Vomiting and abdominal pain    History of present illness:  Subjective   This is a 53-year-old male patient, morbidly obese with a history of RICKY.  He is a  who lives out of town but was passing through this area.  He experienced nausea and vomiting and abdominal pain for about 2 hours before he came to the hospital.  In the ED, he was tachycardic and hypertensive.  He did not have fever.  CT of the abdomen showed distended small bowel loops consistent with intestinal obstruction.  He was seen by Dr. Mckinnon and was taken to the operating room.  He underwent resection of small bowel for incarcerated ventral hernia which was repaired as well.  Apparently, he has a history of chronic ventral hernia.    Postoperatively, patient had difficulties weaning off sedation and required BiPAP in PACU.  He was therefore transferred to our unit for further management.    On my assessment, patient was lethargic and unable to provide with history.  He did not really have any complaints for me.  On questioning, he admitted the diagnosis of obstructive sleep apnea and he uses a PAP machine for the last 2 years.    Labs reviewed:  Glucose 231; bicarb 20; creatinine 1.3; ALT/AST normal; WBC 13; hemoglobin 11.9    Review of Systems  Cannot obtain due to lethargy    History  Past Medical History:   Diagnosis Date   • Diabetes mellitus (CMS/HCC)    • Hernia of abdominal wall    • Hypertension    ,   Past Surgical History:   Procedure Laterality Date   • PARATHYROIDECTOMY     , History reviewed. No pertinent family history.,   Social History     Tobacco Use   • Smoking status: Never Smoker   • Smokeless tobacco: Never Used   Substance Use Topics   • Alcohol use: No     Frequency: Never   • Drug use: No   ,   Medications Prior to Admission   Medication Sig Dispense  Refill Last Dose   • amLODIPine (NORVASC) 10 MG tablet Take 10 mg by mouth Daily.   7/8/2019 at Unknown time   • metFORMIN (GLUCOPHAGE) 1000 MG tablet Take 1,000 mg by mouth 2 (Two) Times a Day With Meals.   7/8/2019 at Unknown time   , Scheduled Meds:    amLODIPine 10 mg Oral Daily   ceFAZolin 3 g Intravenous Q8H   [START ON 7/10/2019] enoxaparin 40 mg Subcutaneous Daily   insulin lispro 0-14 Units Subcutaneous 4x Daily With Meals & Nightly   , Continuous Infusions:    sodium chloride 100 mL/hr Last Rate: 100 mL/hr (07/09/19 1537)    and Allergies:  Patient has no known allergies.    Objective     Vital Signs   Temp:  [97.3 °F (36.3 °C)-98.7 °F (37.1 °C)] 98.2 °F (36.8 °C)  Heart Rate:  [] 117  Resp:  [14-24] 14  BP: (109-201)/() 178/95  FiO2 (%):  [21 %] 21 %    Physical Exam:  Constitutional: Not in acute distress.  Eyes: Injected conjunctiva, EOMI.  ENMT: Dawson 3. No oral thrush. Tonsils grade  Neck: Large. Trachea midline. No thyromegaly  Heart: RRR, no murmur  Lungs: Good and equal air entry bilaterally.  Non labored breathing.   Abdomen: Obese. Soft. No tenderness or dullness. No HSM.  Extremities: No cyanosis, clubbing or pitting edema: . Moves all extremities.  Neuro: Conscious, alert, oriented x3  Psych: Appropriate mood and affect.    Integumentary: No rash  Lymphatic: No palpable cervical or supraclavicular lymph nodes.      Diagnostic imaging:  I personally and independently reviewed the following images:     CT abdomen 7/9/2019: Lower portion of the lungs is clear.  ventral hernia with intestinal obstruction.      Assessment   1. Strangulated ventral hernia, status post repair with small bowel resection 7/9/2019  2. Acute hypoxic respiratory failure, postop, due to hypoventilation from sleep apnea, pain medications and anesthesia  3. Obstructive sleep apnea  4. Hypertensive urgency  5. Diabetes mellitus type 2  6. Hypokalemia, mild  7. Leukocytosis, likely stress-induced  8. Mild  anemia  9. Incidental Left nephrolithiasis, nonobstructive    Recommendations:  · Observe in ICU.  NiPPV for sleep apnea/hypoventilation and increased work of breathing.  Reviewed the settings and made adjustment.  He probably has to use it at night for short.  He cannot use his home machine due to the presence of NG tube.  · NG decompression.  Management per surgery  · Start Lantus 10 units for hyperglycemia and insulin sliding scale.  · Monitor blood pressure.  It is probably elevated now because of pain.  Continue his amlodipine and I will place him on as needed labetalol as well since he is not able to take p.o. medications  · Follow-up labs in a.m.  · Replace potassium  · Minimize narcotics due to underlying sleep apnea and propensity to develop respiratory failure  · DVT prophylaxis with SCD  · Pharmacological DVT prophylaxis when okay with surgery          I discussed the patients findings and my recommendations with nursing staff    Kulwinder Reyes MD  07/09/19  6:16 PM    Time: Critical care 35 min

## 2019-07-09 NOTE — ANESTHESIA PREPROCEDURE EVALUATION
Anesthesia Evaluation     Patient summary reviewed and Nursing notes reviewed   NPO Solid Status: > 8 hours  NPO Liquid Status: > 8 hours           Airway   Mallampati: III  Difficult intubation highly probable, Small opening, Large neck circumference and Anterior  Dental      Pulmonary - negative pulmonary ROS and normal exam    breath sounds clear to auscultation  Cardiovascular - normal exam    (+) hypertension poorly controlled less than 2 medications,       Neuro/Psych- negative ROS  GI/Hepatic/Renal/Endo    (+) morbid obesity,  renal disease CRI, diabetes mellitus type 2 poorly controlled,     Musculoskeletal (-) negative ROS    Abdominal   (+) obese,    Substance History - negative use     OB/GYN          Other - negative ROS                     Anesthesia Plan    ASA 3     general     intravenous induction   Anesthetic plan, all risks, benefits, and alternatives have been provided, discussed and informed consent has been obtained with: patient.

## 2019-07-09 NOTE — ED NOTES
/  Nursing report ED to floor  Jayce Winkler  53 y.o.  male    HPI (triage note):   Chief Complaint   Patient presents with   • Abdominal Pain   • Nausea   • Vomiting       Admitting doctor:   Andrea Mckinnon MD    Admitting diagnosis:   The primary encounter diagnosis was Incarcerated ventral hernia. A diagnosis of Small bowel obstruction (CMS/HCC) was also pertinent to this visit.    Code status:   Current Code Status     This patient does not have a recorded code status. Please follow your organizational policy for patients in this situation.          Allergies:   Patient has no known allergies.    Weight:       07/09/19  0000   Weight: (!) 193 kg (426 lb 6.4 oz)       Most recent vitals:   Vitals:    07/09/19 0203 07/09/19 0215 07/09/19 0230 07/09/19 0245   BP: 109/95      Pulse:       Resp:       Temp:       TempSrc:       SpO2: 94% 93% 99% 97%   Weight:       Height:           Active LDAs/IV Access:   Lines, Drains & Airways    Active LDAs     Name:   Placement date:   Placement time:   Site:   Days:    Peripheral IV 07/09/19 0021 Right Forearm   07/09/19    0021    Forearm   less than 1                Labs (abnormal labs have a star):   Labs Reviewed   COMPREHENSIVE METABOLIC PANEL - Abnormal; Notable for the following components:       Result Value    Glucose 231 (*)     Creatinine 1.29 (*)     CO2 19.8 (*)     Anion Gap 15.2 (*)     All other components within normal limits    Narrative:     GFR Normal >60  Chronic Kidney Disease <60  Kidney Failure <15   URINALYSIS W/ MICROSCOPIC IF INDICATED (NO CULTURE) - Abnormal; Notable for the following components:    Glucose,  mg/dL (2+) (*)     Ketones, UA Trace (*)     Blood, UA Small (1+) (*)     Protein,  mg/dL (2+) (*)     Leuk Esterase, UA Trace (*)     All other components within normal limits   CBC WITH AUTO DIFFERENTIAL - Abnormal; Notable for the following components:    WBC 13.34 (*)     Hemoglobin 11.9 (*)     MCH 24.4 (*)     MCHC 30.7  (*)     Lymphocyte % 18.4 (*)     Neutrophils, Absolute 9.71 (*)     Monocytes, Absolute 0.94 (*)     Immature Grans, Absolute 0.07 (*)     All other components within normal limits   LIPASE - Normal   URINALYSIS, MICROSCOPIC ONLY   CBC AND DIFFERENTIAL    Narrative:     The following orders were created for panel order CBC & Differential.  Procedure                               Abnormality         Status                     ---------                               -----------         ------                     CBC Auto Differential[[id number]]        Abnormal            Final result                 Please view results for these tests on the individual orders.       EKG:   No orders to display       Meds given in ED:   Medications   sodium chloride 0.9 % flush 10 mL (not administered)   HYDROmorphone (DILAUDID) injection 1 mg (1 mg Intravenous Given 7/9/19 0024)   ondansetron (ZOFRAN) injection 4 mg (4 mg Intravenous Given 7/9/19 0024)   sodium chloride 0.9 % bolus 1,000 mL (0 mL Intravenous Stopped 7/9/19 0231)   HYDROmorphone (DILAUDID) injection 1 mg (1 mg Intravenous Given 7/9/19 0119)   ondansetron (ZOFRAN) injection 4 mg (4 mg Intravenous Given 7/9/19 0118)       Imaging results:  Ct Abdomen Pelvis Without Contrast    Result Date: 7/9/2019  1.  Large midline ventral hernia containing a couple of distended small bowel loops concerning for obstruction. 2. Left nephrolithiasis, nonobstructive     This report was finalized on 7/9/2019 1:10 AM by Javon Lemus M.D.        Ambulatory status:   - ambulates without assist    Social issues:   Social History     Socioeconomic History   • Marital status: Single     Spouse name: Not on file   • Number of children: Not on file   • Years of education: Not on file   • Highest education level: Not on file   Tobacco Use   • Smoking status: Never Smoker   • Smokeless tobacco: Never Used   Substance and Sexual Activity   • Alcohol use: No     Frequency: Never   • Drug use: No         Estefania Burgess RN  07/09/19 024

## 2019-07-09 NOTE — ED NOTES
Pt is vomiting, still c/o pain 10/10. Dr. Coker notified. Orders received.     Estefania Burgess, RN  07/09/19 0107

## 2019-07-09 NOTE — ED PROVIDER NOTES
EMERGENCY DEPARTMENT ENCOUNTER    CHIEF COMPLAINT  Chief Complaint: Abdominal pain  History given by: patient   History limited by: n/a   Room Number: I381/1  PMD: Provider, No Known      HPI:  Pt is a 53 y.o. male who presents complaining of periumbilical abd pain that began suddenly 2 hours ago. Pt states that he has a hx of a periumbilical hernia, but it typically does not stick out this far. He also complains of associated nausea and vomiting. No prior abdominal surgeries     Duration:  2 hours   Onset: sudden  Timing: constant   Location: periumbilicall  Radiation: none  Quality: pain  Intensity/Severity: moderate  Progression: unchanged   Associated Symptoms: nausea, vomiting  Aggravating Factors: none  Alleviating Factors: none    PAST MEDICAL HISTORY  Active Ambulatory Problems     Diagnosis Date Noted   • No Active Ambulatory Problems     Resolved Ambulatory Problems     Diagnosis Date Noted   • No Resolved Ambulatory Problems     Past Medical History:   Diagnosis Date   • Diabetes mellitus (CMS/HCC)    • Hernia of abdominal wall    • Hypertension        PAST SURGICAL HISTORY  Past Surgical History:   Procedure Laterality Date   • PARATHYROIDECTOMY         FAMILY HISTORY  History reviewed. No pertinent family history.    SOCIAL HISTORY  Social History     Socioeconomic History   • Marital status: Single     Spouse name: Not on file   • Number of children: Not on file   • Years of education: Not on file   • Highest education level: Not on file   Tobacco Use   • Smoking status: Never Smoker   • Smokeless tobacco: Never Used   Substance and Sexual Activity   • Alcohol use: No     Frequency: Never   • Drug use: No   • Sexual activity: Defer       ALLERGIES  Patient has no known allergies.    REVIEW OF SYSTEMS  Review of Systems   Constitutional: Negative for activity change, appetite change and fever.   HENT: Negative for congestion and sore throat.    Eyes: Negative.    Respiratory: Negative for cough and  shortness of breath.    Cardiovascular: Negative for chest pain and leg swelling.   Gastrointestinal: Positive for abdominal pain, nausea and vomiting. Negative for diarrhea.   Endocrine: Negative.    Genitourinary: Negative for decreased urine volume and dysuria.   Musculoskeletal: Negative for neck pain.   Skin: Negative for rash and wound.   Allergic/Immunologic: Negative.    Neurological: Negative for weakness, numbness and headaches.   Hematological: Negative.    Psychiatric/Behavioral: Negative.    All other systems reviewed and are negative.      PHYSICAL EXAM  ED Triage Vitals [07/08/19 2358]   Temp Pulse Resp BP SpO2   98.7 °F (37.1 °C) -- -- -- --      Temp src Heart Rate Source Patient Position BP Location FiO2 (%)   Tympanic -- -- -- --       Physical Exam   Constitutional: He is oriented to person, place, and time. He appears distressed.   HENT:   Head: Normocephalic and atraumatic.   Eyes: EOM are normal. Pupils are equal, round, and reactive to light.   Neck: Normal range of motion. Neck supple.   Cardiovascular: Normal rate, regular rhythm and normal heart sounds.   Pulmonary/Chest: Effort normal and breath sounds normal. No respiratory distress.   Abdominal: Soft. There is tenderness (diffuse). There is no rebound and no guarding. A hernia (large, non reducible) is present.   Musculoskeletal: Normal range of motion. He exhibits no edema.   Neurological: He is alert and oriented to person, place, and time. He has normal sensation and normal strength.   Skin: Skin is warm and dry.   Psychiatric: Mood and affect normal.   Nursing note and vitals reviewed.      LAB RESULTS  Lab Results (last 24 hours)     Procedure Component Value Units Date/Time    Urinalysis With Microscopic If Indicated (No Culture) - Urine, Clean Catch [[id number]]  (Abnormal) Collected:  07/09/19 0222    Specimen:  Urine, Clean Catch Updated:  07/09/19 0244     Color, UA Yellow     Appearance, UA Clear     pH, UA 5.0     Specific  Gravity, UA 1.022     Glucose,  mg/dL (2+)     Ketones, UA Trace     Bilirubin, UA Negative     Blood, UA Small (1+)     Protein,  mg/dL (2+)     Leuk Esterase, UA Trace     Nitrite, UA Negative     Urobilinogen, UA 0.2 E.U./dL    Urinalysis, Microscopic Only - Urine, Clean Catch [[id number]] Collected:  07/09/19 0222    Specimen:  Urine, Clean Catch Updated:  07/09/19 0311     RBC, UA None Seen /HPF      WBC, UA 0-2 /HPF      Bacteria, UA None Seen /HPF      Squamous Epithelial Cells, UA None Seen /HPF      Hyaline Casts, UA None Seen /LPF      Methodology Manual Light Microscopy    POC Glucose Once [[id number]]  (Abnormal) Collected:  07/09/19 0747    Specimen:  Blood Updated:  07/09/19 0749     Glucose 252 mg/dL     Tissue Pathology Exam [[id number]] Collected:  07/09/19 0959    Specimen:  Tissue from Small Intestine; Tissue from Hernia, Sac Updated:  07/09/19 1235    POC Glucose Once [[id number]]  (Abnormal) Collected:  07/09/19 1243    Specimen:  Blood Updated:  07/09/19 1244     Glucose 230 mg/dL     POC Glucose Once [[id number]]  (Abnormal) Collected:  07/09/19 1530    Specimen:  Blood Updated:  07/09/19 1555     Glucose 201 mg/dL     POC Glucose Once [[id number]]  (Abnormal) Collected:  07/09/19 1754    Specimen:  Blood Updated:  07/09/19 1756     Glucose 202 mg/dL     POC Glucose Once [[id number]]  (Abnormal) Collected:  07/09/19 2046    Specimen:  Blood Updated:  07/09/19 2052     Glucose 218 mg/dL     POC Glucose Once [[id number]]  (Abnormal) Collected:  07/09/19 2336    Specimen:  Blood Updated:  07/09/19 2338     Glucose 175 mg/dL           I ordered the above labs and reviewed the results    RADIOLOGY  CT Abdomen Pelvis Without Contrast   Final Result   1.  Large midline ventral hernia containing a couple of distended small   bowel loops concerning for obstruction.   2. Left nephrolithiasis, nonobstructive                   This report was finalized on 7/9/2019 1:10 AM by Javon Lemus M.D.                I ordered the above noted radiological studies. Interpreted by radiologist. Reviewed by me in PACS.       PROCEDURES  Procedures      PROGRESS AND CONSULTS       00;07  CMP, CBC, lipase, and UA ordered.     00:11  BP- 157/100 HR- 97 Temp- 98.7 °F (37.1 °C) (Tympanic) O2 sat- 98%  Informed pt of the plan for labs and CT abd/pelvis. Will treat pain and nausea. Pt understands and agrees with the plan, all questions answered.    00:14  CT abd/pelvis ordered. IVF ordered for hydration. Dilaudid and Zofran ordered to treat pain and nausea.     01:12  Dilaudid and Zofran ordered to treat pain and nausea.     01:45  Call placed general surgery for consult.     01:51  Discussed pt's case with Dr Mckinnon (general surgery), who agrees to admit the pt, would like an NG tube placed.     02:24  BP- 109/95 HR- 95 Temp- 98.7 °F (37.1 °C) (Tympanic) O2 sat- 93%  Rechecked the patient who is in NAD and is resting comfortably. Informed pt that the CT shows bowel in the hernia with resulting SBO. Plan for admission to surgery. Pt understands and agrees with the plan, all questions answered.    MEDICAL DECISION MAKING  Results were reviewed/discussed with the patient and they were also made aware of online access. Pt also made aware that some labs, such as cultures, will not be resulted during ER visit and follow up with PMD is necessary.     MDM  Number of Diagnoses or Management Options     Amount and/or Complexity of Data Reviewed  Clinical lab tests: ordered and reviewed (WBC=13.34)  Tests in the radiology section of CPT®: ordered and reviewed (CT abd/pelvis shows large midline ventral hernia containing a couple of distended small bowel loops concerning for obstruction.  )  Decide to obtain previous medical records or to obtain history from someone other than the patient: yes  Review and summarize past medical records: yes (No prior visits.)  Discuss the patient with other providers: yes (Dr Mckinnon (general  surgery))    Patient Progress  Patient progress: stable         DIAGNOSIS  Final diagnoses:   Incarcerated ventral hernia   Small bowel obstruction (CMS/HCC)       DISPOSITION  ADMISSION    Discussed treatment plan and reason for admission with pt/family and admitting physician.  Pt/family voiced understanding of the plan for admission for further testing/treatment as needed.     Latest Documented Vital Signs:  As of 12:29 AM  BP- 113/69 HR- 103 Temp- 98.7 °F (37.1 °C) (Oral) O2 sat- 93%    --  Documentation assistance provided by jacquelyn Dyson for Dr Coker.  Information recorded by the scrkervin was done at my direction and has been verified and validated by me.        Soledad Dyson  07/09/19 0225       Rodrigue Coker MD  07/10/19 0029

## 2019-07-09 NOTE — ED NOTES
Pt to ER via EMS. He is a  and here from out of town. He c/o abdominal pain with nausea and vomiting x 2 hours. Hx. Of abdominal hernia. Pt restless/very uncomfortable and diaphoretic.     Rosa Smith RN  07/08/19 1442       Rosa Smith RN  07/09/19 0010

## 2019-07-09 NOTE — OP NOTE
PREOPERATIVE DIAGNOSIS:  Incarcerated ventral hernia    POSTOPERATIVE DIAGNOSIS (FINDINGS):  Strangulated ventral hernia    PROCEDURE:  -Strangulated ventral hernia repair with mesh  -Small bowel resection    SURGEON:  Andrea Mckinnon MD    ASSISTANT:  Katie Moss    ANESTHESIA:  General    EBL:  Minimal    SPECIMEN(S):  Long segment of ileum    DESCRIPTION:  In supine position under general anesthetic prepped and draped usual sterile manner.  Midline laparotomy incision made and tense incarcerated hernia sac encountered and opened.  Significant amount of incarcerated and strangulated small bowel was encountered.  Dissection was carried down to the level of the fascia and the fascial defect was extended to allow reduction of the hernia contents.  Pressure from dissection caused perforation of a strangulated segment of bowel and this was quickly oversewn.  I identified the viable proximal segment of bowel and divided at that level with the KESHA stapler.  The definitely viable distal segment of bowel was only several inches proximal to the ileocecal valve and this it was divided with the KESHA stapler as well.  I came through the mesentery with the Enseal device and passed off the specimen.  Handsewn end-to-end anastomosis was fashioned 2 layers of outer interrupted 2-0 silk and running interlocking 3-0 chromic.  Mesenteric defect was closed with interrupted 2-0 silk and bowels return to the peritoneal cavity.  Good hemostasis was ensured.  Fascial edges were freshened and I placed a 20 x 20 cm bio a mesh in the peritoneal cavity and closed the fascia over the mesh incorporating bites of the mesh in the closure with interrupted 0 Nurolon suture.  Again, good hemostasis noted.  219 Indonesian Jose drains placed.  Skin closed with 3-0 Vicryl deep dermal and skin staples.  Tolerated well, stable to recovery room.    Andrea Mckinnon M.D.

## 2019-07-10 LAB
ANION GAP SERPL CALCULATED.3IONS-SCNC: 10.8 MMOL/L (ref 5–15)
BASOPHILS # BLD AUTO: 0.02 10*3/MM3 (ref 0–0.2)
BASOPHILS NFR BLD AUTO: 0.3 % (ref 0–1.5)
BUN BLD-MCNC: 22 MG/DL (ref 6–20)
BUN/CREAT SERPL: 12 (ref 7–25)
CALCIUM SPEC-SCNC: 8.2 MG/DL (ref 8.6–10.5)
CHLORIDE SERPL-SCNC: 105 MMOL/L (ref 98–107)
CO2 SERPL-SCNC: 24.2 MMOL/L (ref 22–29)
CREAT BLD-MCNC: 1.83 MG/DL (ref 0.76–1.27)
DEPRECATED RDW RBC AUTO: 44.1 FL (ref 37–54)
EOSINOPHIL # BLD AUTO: 0.03 10*3/MM3 (ref 0–0.4)
EOSINOPHIL NFR BLD AUTO: 0.4 % (ref 0.3–6.2)
ERYTHROCYTE [DISTWIDTH] IN BLOOD BY AUTOMATED COUNT: 15 % (ref 12.3–15.4)
GFR SERPL CREATININE-BSD FRML MDRD: 47 ML/MIN/1.73
GLUCOSE BLD-MCNC: 190 MG/DL (ref 65–99)
GLUCOSE BLDC GLUCOMTR-MCNC: 149 MG/DL (ref 70–130)
GLUCOSE BLDC GLUCOMTR-MCNC: 154 MG/DL (ref 70–130)
GLUCOSE BLDC GLUCOMTR-MCNC: 177 MG/DL (ref 70–130)
GLUCOSE BLDC GLUCOMTR-MCNC: 185 MG/DL (ref 70–130)
HCT VFR BLD AUTO: 33.6 % (ref 37.5–51)
HGB BLD-MCNC: 10.2 G/DL (ref 13–17.7)
IMM GRANULOCYTES # BLD AUTO: 0.03 10*3/MM3 (ref 0–0.05)
IMM GRANULOCYTES NFR BLD AUTO: 0.4 % (ref 0–0.5)
LAB AP CASE REPORT: NORMAL
LAB AP CLINICAL INFORMATION: NORMAL
LYMPHOCYTES # BLD AUTO: 0.96 10*3/MM3 (ref 0.7–3.1)
LYMPHOCYTES NFR BLD AUTO: 13.6 % (ref 19.6–45.3)
MAGNESIUM SERPL-MCNC: 2 MG/DL (ref 1.6–2.6)
MCH RBC QN AUTO: 24.6 PG (ref 26.6–33)
MCHC RBC AUTO-ENTMCNC: 30.4 G/DL (ref 31.5–35.7)
MCV RBC AUTO: 81.2 FL (ref 79–97)
MONOCYTES # BLD AUTO: 0.81 10*3/MM3 (ref 0.1–0.9)
MONOCYTES NFR BLD AUTO: 11.5 % (ref 5–12)
NEUTROPHILS # BLD AUTO: 5.19 10*3/MM3 (ref 1.7–7)
NEUTROPHILS NFR BLD AUTO: 73.8 % (ref 42.7–76)
NRBC BLD AUTO-RTO: 0 /100 WBC (ref 0–0.2)
PATH REPORT.FINAL DX SPEC: NORMAL
PATH REPORT.GROSS SPEC: NORMAL
PLATELET # BLD AUTO: 205 10*3/MM3 (ref 140–450)
PMV BLD AUTO: 10.1 FL (ref 6–12)
POTASSIUM BLD-SCNC: 4.1 MMOL/L (ref 3.5–5.2)
RBC # BLD AUTO: 4.14 10*6/MM3 (ref 4.14–5.8)
SODIUM BLD-SCNC: 140 MMOL/L (ref 136–145)
WBC NRBC COR # BLD: 7.04 10*3/MM3 (ref 3.4–10.8)

## 2019-07-10 PROCEDURE — 25010000002 HYDROMORPHONE PER 4 MG: Performed by: SURGERY

## 2019-07-10 PROCEDURE — 25010000002 ONDANSETRON PER 1 MG: Performed by: SURGERY

## 2019-07-10 PROCEDURE — 63710000001 INSULIN GLARGINE PER 5 UNITS: Performed by: INTERNAL MEDICINE

## 2019-07-10 PROCEDURE — 85025 COMPLETE CBC W/AUTO DIFF WBC: CPT | Performed by: SURGERY

## 2019-07-10 PROCEDURE — 25010000002 ENOXAPARIN PER 10 MG: Performed by: SURGERY

## 2019-07-10 PROCEDURE — 63710000001 INSULIN LISPRO (HUMAN) PER 5 UNITS: Performed by: SURGERY

## 2019-07-10 PROCEDURE — 82962 GLUCOSE BLOOD TEST: CPT

## 2019-07-10 PROCEDURE — 94799 UNLISTED PULMONARY SVC/PX: CPT

## 2019-07-10 PROCEDURE — 80048 BASIC METABOLIC PNL TOTAL CA: CPT | Performed by: SURGERY

## 2019-07-10 PROCEDURE — 83735 ASSAY OF MAGNESIUM: CPT | Performed by: INTERNAL MEDICINE

## 2019-07-10 RX ADMIN — ONDANSETRON HYDROCHLORIDE 4 MG: 2 SOLUTION INTRAMUSCULAR; INTRAVENOUS at 23:33

## 2019-07-10 RX ADMIN — SODIUM CHLORIDE 100 ML/HR: 9 INJECTION, SOLUTION INTRAVENOUS at 01:38

## 2019-07-10 RX ADMIN — INSULIN LISPRO 3 UNITS: 100 INJECTION, SOLUTION INTRAVENOUS; SUBCUTANEOUS at 20:21

## 2019-07-10 RX ADMIN — HYDROMORPHONE HYDROCHLORIDE 0.5 MG: 1 INJECTION, SOLUTION INTRAMUSCULAR; INTRAVENOUS; SUBCUTANEOUS at 16:02

## 2019-07-10 RX ADMIN — ENOXAPARIN SODIUM 40 MG: 40 INJECTION SUBCUTANEOUS at 08:19

## 2019-07-10 RX ADMIN — HYDROMORPHONE HYDROCHLORIDE 0.5 MG: 1 INJECTION, SOLUTION INTRAMUSCULAR; INTRAVENOUS; SUBCUTANEOUS at 11:13

## 2019-07-10 RX ADMIN — INSULIN LISPRO 2 UNITS: 100 INJECTION, SOLUTION INTRAVENOUS; SUBCUTANEOUS at 12:17

## 2019-07-10 RX ADMIN — HYDROMORPHONE HYDROCHLORIDE 0.5 MG: 1 INJECTION, SOLUTION INTRAMUSCULAR; INTRAVENOUS; SUBCUTANEOUS at 21:28

## 2019-07-10 RX ADMIN — HYDROMORPHONE HYDROCHLORIDE 0.5 MG: 1 INJECTION, SOLUTION INTRAMUSCULAR; INTRAVENOUS; SUBCUTANEOUS at 14:00

## 2019-07-10 RX ADMIN — INSULIN GLARGINE 10 UNITS: 100 INJECTION, SOLUTION SUBCUTANEOUS at 20:21

## 2019-07-10 RX ADMIN — INSULIN LISPRO 3 UNITS: 100 INJECTION, SOLUTION INTRAVENOUS; SUBCUTANEOUS at 08:20

## 2019-07-10 RX ADMIN — HYDROMORPHONE HYDROCHLORIDE 0.5 MG: 1 INJECTION, SOLUTION INTRAMUSCULAR; INTRAVENOUS; SUBCUTANEOUS at 19:19

## 2019-07-10 RX ADMIN — AMLODIPINE BESYLATE 10 MG: 10 TABLET ORAL at 08:19

## 2019-07-10 RX ADMIN — HYDROMORPHONE HYDROCHLORIDE 0.5 MG: 1 INJECTION, SOLUTION INTRAMUSCULAR; INTRAVENOUS; SUBCUTANEOUS at 06:38

## 2019-07-10 NOTE — PROGRESS NOTES
Postoperative day 1 open strangulated incisional hernia repair with small bowel resection    Afebrile vital signs stable  Labs reviewed  Abdomen soft, JOHNNY serosanguineous    Transfer out of ICU

## 2019-07-10 NOTE — PLAN OF CARE
Problem: Patient Care Overview  Goal: Plan of Care Review  Outcome: Ongoing (interventions implemented as appropriate)   07/10/19 1811   Plan of Care Review   Progress improving   OTHER   Outcome Summary Pt was a transfer from ICU today. C/O pain, PRN pain medication given. Gaviria removed this AM, ADILSON removed this AM. Pt was able to stand on the side of the bed. Sat up on the side of the bed 2 times today. Plans to ambulate more later. Pt HR runs tachy. NPO except ice chips and sips. Will continue to monitor.    Coping/Psychosocial   Plan of Care Reviewed With patient       Problem: Pain, Acute (Adult)  Goal: Identify Related Risk Factors and Signs and Symptoms  Outcome: Outcome(s) achieved Date Met: 07/10/19    Goal: Acceptable Pain Control/Comfort Level  Outcome: Ongoing (interventions implemented as appropriate)      Problem: Skin Injury Risk (Adult)  Goal: Identify Related Risk Factors and Signs and Symptoms  Outcome: Outcome(s) achieved Date Met: 07/10/19    Goal: Skin Health and Integrity  Outcome: Ongoing (interventions implemented as appropriate)      Problem: Fall Risk (Adult)  Goal: Identify Related Risk Factors and Signs and Symptoms  Outcome: Outcome(s) achieved Date Met: 07/10/19    Goal: Absence of Fall  Outcome: Ongoing (interventions implemented as appropriate)

## 2019-07-10 NOTE — PLAN OF CARE
Problem: Patient Care Overview  Goal: Plan of Care Review  Outcome: Ongoing (interventions implemented as appropriate)   07/10/19 0579   OTHER   Outcome Summary VSS, am labs, pain control, IVF, NG and f/c remain, hopefully remove today, incison site CDI, TCDB and encourage OOB today w/ asst, hopefully move out of ICU   Coping/Psychosocial   Plan of Care Reviewed With patient

## 2019-07-10 NOTE — PROGRESS NOTES
Discharge Planning Assessment  [facility]     Patient Name: Jayce Winkler  MRN: [id number]  Today's Date: 7/10/2019    Admit Date: 7/9/2019    Discharge Needs Assessment     Row Name 07/10/19 1457       Living Environment    Lives With  alone    Current Living Arrangements  home/apartment/condo    Primary Care Provided by  self    Provides Primary Care For  no one    Family Caregiver if Needed  parent(s)    Quality of Family Relationships  supportive    Able to Return to Prior Arrangements  yes       Resource/Environmental Concerns    Resource/Environmental Concerns  none       Transition Planning    Patient/Family Anticipates Transition to  home    Patient/Family Anticipated Services at Transition  none    Transportation Anticipated  car, drives self       Discharge Needs Assessment    Readmission Within the Last 30 Days  no previous admission in last 30 days    Concerns to be Addressed  denies needs/concerns at this time    Equipment Currently Used at Home  none    Equipment Needed After Discharge  none        Discharge Plan     Row Name 07/10/19 1458       Plan    Plan  Home to [facility]    Plan Comments  Spoke with pt for screening of DCP/needs. 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 Pt stated that all his family lives in [place].   Pt stated that he does live alone and was totally independent PTA.  Pt stated that he is unsure if he does have medcially insurnace through his company but will find out. Pt statedt hat his company did fly a  to [facility] to get the truck and load he was driving to complete the deleiveries.  Pt stated that his company informed him that they will plan to fly pt home once he is DC'ed from the hospital.   Pt agreeable to use meds to beds and stated he should be able to pay for any needed meds upon D/C.   CCP offerd to call his employer/supervisor to inquire about insurnace and pt declinced  this offer.  Pt did confirm facesheet information as correct.  CCP will be following to assist as needed with any DC needs.             Destination      No service coordination in this encounter.      Durable Medical Equipment      No service coordination in this encounter.      Dialysis/Infusion      No service coordination in this encounter.      Home Medical Care      No service coordination in this encounter.      Therapy      No service coordination in this encounter.      Community Resources      No service coordination in this encounter.          Demographic Summary     Row Name 07/10/19 1456       General Information    Admission Type  inpatient    Arrived From  emergency department    Referral Source  admission list    Reason for Consult  discharge planning    Preferred Language  English     Used During This Interaction  no        Functional Status     Row Name 07/10/19 1456       Functional Status    Usual Activity Tolerance  good    Current Activity Tolerance  moderate       Functional Status, IADL    Medications  independent    Meal Preparation  independent    Housekeeping  independent    Laundry  independent    Shopping  independent       Mental Status    General Appearance WDL  WDL       Mental Status Summary    Recent Changes in Mental Status/Cognitive Functioning  no changes       Employment/    Employment Status  employed full time    Current or Previous Occupation  traveling/driving        Psychosocial    No documentation.       Abuse/Neglect    No documentation.       Legal    No documentation.       Substance Abuse    No documentation.       Patient Forms    No documentation.           WILLY Moy

## 2019-07-11 LAB
ANION GAP SERPL CALCULATED.3IONS-SCNC: 13.3 MMOL/L (ref 5–15)
BACTERIA UR QL AUTO: ABNORMAL /HPF
BILIRUB UR QL STRIP: NEGATIVE
BUN BLD-MCNC: 17 MG/DL (ref 6–20)
BUN/CREAT SERPL: 11.6 (ref 7–25)
CALCIUM SPEC-SCNC: 9.4 MG/DL (ref 8.6–10.5)
CHLORIDE SERPL-SCNC: 100 MMOL/L (ref 98–107)
CK SERPL-CCNC: 265 U/L (ref 20–200)
CLARITY UR: CLEAR
CO2 SERPL-SCNC: 24.7 MMOL/L (ref 22–29)
COLOR UR: ABNORMAL
CREAT BLD-MCNC: 1.46 MG/DL (ref 0.76–1.27)
CREAT UR-MCNC: 339.3 MG/DL
GFR SERPL CREATININE-BSD FRML MDRD: 61 ML/MIN/1.73
GLUCOSE BLD-MCNC: 202 MG/DL (ref 65–99)
GLUCOSE BLDC GLUCOMTR-MCNC: 158 MG/DL (ref 70–130)
GLUCOSE BLDC GLUCOMTR-MCNC: 163 MG/DL (ref 70–130)
GLUCOSE BLDC GLUCOMTR-MCNC: 167 MG/DL (ref 70–130)
GLUCOSE BLDC GLUCOMTR-MCNC: 177 MG/DL (ref 70–130)
GLUCOSE UR STRIP-MCNC: NEGATIVE MG/DL
HGB UR QL STRIP.AUTO: ABNORMAL
HYALINE CASTS UR QL AUTO: ABNORMAL /LPF
KETONES UR QL STRIP: ABNORMAL
LEUKOCYTE ESTERASE UR QL STRIP.AUTO: NEGATIVE
NITRITE UR QL STRIP: NEGATIVE
PH UR STRIP.AUTO: 5.5 [PH] (ref 5–8)
POTASSIUM BLD-SCNC: 4.1 MMOL/L (ref 3.5–5.2)
PROT UR QL STRIP: ABNORMAL
RBC # UR: ABNORMAL /HPF
REF LAB TEST METHOD: ABNORMAL
SODIUM BLD-SCNC: 138 MMOL/L (ref 136–145)
SODIUM UR-SCNC: 38 MMOL/L
SP GR UR STRIP: >=1.03 (ref 1–1.03)
SQUAMOUS #/AREA URNS HPF: ABNORMAL /HPF
UROBILINOGEN UR QL STRIP: ABNORMAL
WBC UR QL AUTO: ABNORMAL /HPF

## 2019-07-11 PROCEDURE — 25010000002 ONDANSETRON PER 1 MG: Performed by: SURGERY

## 2019-07-11 PROCEDURE — 94660 CPAP INITIATION&MGMT: CPT

## 2019-07-11 PROCEDURE — 63710000001 INSULIN LISPRO (HUMAN) PER 5 UNITS: Performed by: SURGERY

## 2019-07-11 PROCEDURE — 82570 ASSAY OF URINE CREATININE: CPT | Performed by: INTERNAL MEDICINE

## 2019-07-11 PROCEDURE — 25010000002 ENOXAPARIN PER 10 MG: Performed by: SURGERY

## 2019-07-11 PROCEDURE — 80048 BASIC METABOLIC PNL TOTAL CA: CPT | Performed by: INTERNAL MEDICINE

## 2019-07-11 PROCEDURE — 94799 UNLISTED PULMONARY SVC/PX: CPT

## 2019-07-11 PROCEDURE — 82962 GLUCOSE BLOOD TEST: CPT

## 2019-07-11 PROCEDURE — 63710000001 INSULIN GLARGINE PER 5 UNITS: Performed by: INTERNAL MEDICINE

## 2019-07-11 PROCEDURE — 82550 ASSAY OF CK (CPK): CPT | Performed by: INTERNAL MEDICINE

## 2019-07-11 PROCEDURE — 84300 ASSAY OF URINE SODIUM: CPT | Performed by: INTERNAL MEDICINE

## 2019-07-11 PROCEDURE — 81001 URINALYSIS AUTO W/SCOPE: CPT | Performed by: INTERNAL MEDICINE

## 2019-07-11 RX ADMIN — ONDANSETRON HYDROCHLORIDE 4 MG: 2 SOLUTION INTRAMUSCULAR; INTRAVENOUS at 08:42

## 2019-07-11 RX ADMIN — SODIUM CHLORIDE 75 ML/HR: 9 INJECTION, SOLUTION INTRAVENOUS at 21:07

## 2019-07-11 RX ADMIN — INSULIN LISPRO 3 UNITS: 100 INJECTION, SOLUTION INTRAVENOUS; SUBCUTANEOUS at 21:06

## 2019-07-11 RX ADMIN — ENOXAPARIN SODIUM 40 MG: 40 INJECTION SUBCUTANEOUS at 08:42

## 2019-07-11 RX ADMIN — AMLODIPINE BESYLATE 10 MG: 10 TABLET ORAL at 08:43

## 2019-07-11 RX ADMIN — INSULIN GLARGINE 10 UNITS: 100 INJECTION, SOLUTION SUBCUTANEOUS at 21:05

## 2019-07-11 NOTE — PROGRESS NOTES
Postoperative day 2 strangulated incisional hernia repair with small bowel resection    Afebrile vital signs stable  JOHNNY serosanguineous  Abdomen soft    -Start clear liquids in a.m.

## 2019-07-11 NOTE — PLAN OF CARE
Problem: Patient Care Overview  Goal: Plan of Care Review  Outcome: Ongoing (interventions implemented as appropriate)   07/10/19 2021 07/11/19 0318   Plan of Care Review   Progress --  no change   OTHER   Outcome Summary --  Pt with flat affect. Pt complains of pain. PRN pain medication given x2 thus far. C/O nausea and vomiting x1, Zofran given x1. Max temp 100.8. Pt asked for sleeping pill at 0300, stating that he can't sleep. Up to chair x1. Will continue to monitor.   Coping/Psychosocial   Plan of Care Reviewed With patient --      Goal: Discharge Needs Assessment  Outcome: Ongoing (interventions implemented as appropriate)    Goal: Interprofessional Rounds/Family Conf  Outcome: Ongoing (interventions implemented as appropriate)      Problem: Pain, Acute (Adult)  Goal: Acceptable Pain Control/Comfort Level  Outcome: Ongoing (interventions implemented as appropriate)      Problem: Skin Injury Risk (Adult)  Goal: Skin Health and Integrity  Outcome: Ongoing (interventions implemented as appropriate)      Problem: Fall Risk (Adult)  Goal: Absence of Fall  Outcome: Ongoing (interventions implemented as appropriate)

## 2019-07-11 NOTE — PLAN OF CARE
Problem: Patient Care Overview  Goal: Plan of Care Review  Outcome: Ongoing (interventions implemented as appropriate)   07/11/19 2836   Plan of Care Review   Progress no change   OTHER   Outcome Summary Pt ambualting in room, up to chair. Some C/O N/V this am, medicated with Zofran. C/O pain, but refusing pain medications. Will conitnue to monitor.    Coping/Psychosocial   Plan of Care Reviewed With patient     Goal: Individualization and Mutuality  Outcome: Ongoing (interventions implemented as appropriate)    Goal: Discharge Needs Assessment  Outcome: Ongoing (interventions implemented as appropriate)    Goal: Interprofessional Rounds/Family Conf  Outcome: Ongoing (interventions implemented as appropriate)      Problem: Pain, Acute (Adult)  Goal: Acceptable Pain Control/Comfort Level  Outcome: Ongoing (interventions implemented as appropriate)      Problem: Skin Injury Risk (Adult)  Goal: Skin Health and Integrity  Outcome: Ongoing (interventions implemented as appropriate)      Problem: Fall Risk (Adult)  Goal: Absence of Fall  Outcome: Ongoing (interventions implemented as appropriate)

## 2019-07-11 NOTE — PROGRESS NOTES
"                                              LOS: 1 day   Patient Care Team:  Provider, No Known as PCP - General    Chief Complaint:  F/up  respiratory failure postoperatively, diabetes mellitus type 2 and medical problems listed below.    Subjective   Interval History  Doing better today.  Off BiPAP during the day.  He is on room air as well.  Still has abdominal pain.  Has not had a bowel movement and stated that he is not passing gas but tolerating diet.    REVIEW OF SYSTEMS:     RESPIRATORY: No shortness of breath, cough or sputum.                       Physical Exam:     Vital Signs  Temp:  [97.8 °F (36.6 °C)-100.8 °F (38.2 °C)] 99.4 °F (37.4 °C)  Heart Rate:  [] 116  Resp:  [18-22] 22  BP: (113-146)/(69-88) 132/71  FiO2 (%):  [30 %] 30 %    Intake/Output Summary (Last 24 hours) at 7/10/2019 2316  Last data filed at 7/10/2019 1830  Gross per 24 hour   Intake 1585 ml   Output 1990 ml   Net -405 ml     Flowsheet Rows      First Filed Value   Admission Height  188 cm (74\") Documented at 07/08/2019 2358   Admission Weight  193 kg (426 lb 6.4 oz)  (Abnormal)  Documented at 07/09/2019 0000          General Appearance:    Alert, cooperative, in no acute distress   ENMT:  Mallampati score 3, moist mucous membrane   Eyes: Pupils equals and reactive to light. EOMI   Neck:   Large. Trachea midline. No thyromegaly.   Lungs:     Clear to auscultation,respirations regular, even and                  unlabored    Heart:    Regular rhythm and normal rate, normal S1 and S2, no            murmur   Skin:    No abnormalities observed   Abdomen:     Obese. Soft.  Generalized tenderness around the midline.  No rebound or guarding.  JOHNNY drain noted   Neuro:   Conscious, alert, oriented x3. Strength 5/5 in upper and lower ext   Extremities:   Moves all extremities well, no edema, no cyanosis, no             Redness          Results Review:        Results from last 7 days   Lab Units 07/10/19  0407 07/09/19  0023   SODIUM mmol/L " 140 138   POTASSIUM mmol/L 4.1 3.5   CHLORIDE mmol/L 105 103   CO2 mmol/L 24.2 19.8*   BUN mg/dL 22* 18   CREATININE mg/dL 1.83* 1.29*   GLUCOSE mg/dL 190* 231*   CALCIUM mg/dL 8.2* 9.2         Results from last 7 days   Lab Units 07/10/19  0407 07/09/19  0023   WBC 10*3/mm3 7.04 13.34*   HEMOGLOBIN g/dL 10.2* 11.9*   HEMATOCRIT % 33.6* 38.8   PLATELETS 10*3/mm3 205 275               I reviewed the patient's new clinical results.  I personally viewed and interpreted the patient's CXR        Medication Review:     amLODIPine 10 mg Oral Daily   enoxaparin 40 mg Subcutaneous Daily   insulin glargine 10 Units Subcutaneous Nightly   insulin lispro 0-14 Units Subcutaneous 4x Daily With Meals & Nightly         sodium chloride 100 mL/hr Last Rate: 100 mL/hr (07/10/19 0138)       Assessment   1. Strangulated ventral hernia, status post repair with small bowel resection 7/9/2019  2. Acute hypoxic respiratory failure, postop, due to hypoventilation from sleep apnea, pain medications and anesthesia, RESOLVED  3. Obstructive sleep apnea  4. ALEC, WORSE  5. Hypertensive urgency, resolved  6. Diabetes mellitus type 2  7. Leukocytosis, RESOLVED  8. Mild anemia  9. Incidental Left nephrolithiasis, nonobstructive  10. Hypertension      Plan   · Start Lantus 10 units for hypoglycemia.  Continue insulin sliding scale.  · Auto CPAP at night for sleep apnea.   · Check urinalysis, urine lites and CPK due to ALEC.  Continue IV hydration with normal saline  · Continue Norvasc for hypertension  · DVT prophylaxis with Lovenox        Kulwinder Reyes MD  07/10/19  11:16 PM            This note was dictated utilizing Dragon dictation

## 2019-07-12 LAB
ANION GAP SERPL CALCULATED.3IONS-SCNC: 12.4 MMOL/L (ref 5–15)
BUN BLD-MCNC: 17 MG/DL (ref 6–20)
BUN/CREAT SERPL: 13.7 (ref 7–25)
CALCIUM SPEC-SCNC: 8.5 MG/DL (ref 8.6–10.5)
CHLORIDE SERPL-SCNC: 104 MMOL/L (ref 98–107)
CO2 SERPL-SCNC: 24.6 MMOL/L (ref 22–29)
CREAT BLD-MCNC: 1.24 MG/DL (ref 0.76–1.27)
GFR SERPL CREATININE-BSD FRML MDRD: 74 ML/MIN/1.73
GLUCOSE BLD-MCNC: 157 MG/DL (ref 65–99)
GLUCOSE BLDC GLUCOMTR-MCNC: 130 MG/DL (ref 70–130)
GLUCOSE BLDC GLUCOMTR-MCNC: 132 MG/DL (ref 70–130)
GLUCOSE BLDC GLUCOMTR-MCNC: 132 MG/DL (ref 70–130)
GLUCOSE BLDC GLUCOMTR-MCNC: 139 MG/DL (ref 70–130)
POTASSIUM BLD-SCNC: 3.7 MMOL/L (ref 3.5–5.2)
SODIUM BLD-SCNC: 141 MMOL/L (ref 136–145)

## 2019-07-12 PROCEDURE — 25010000002 HYDROMORPHONE PER 4 MG: Performed by: SURGERY

## 2019-07-12 PROCEDURE — 94799 UNLISTED PULMONARY SVC/PX: CPT

## 2019-07-12 PROCEDURE — 63710000001 INSULIN GLARGINE PER 5 UNITS: Performed by: INTERNAL MEDICINE

## 2019-07-12 PROCEDURE — 80048 BASIC METABOLIC PNL TOTAL CA: CPT | Performed by: INTERNAL MEDICINE

## 2019-07-12 PROCEDURE — 25010000002 ENOXAPARIN PER 10 MG: Performed by: SURGERY

## 2019-07-12 PROCEDURE — 82962 GLUCOSE BLOOD TEST: CPT

## 2019-07-12 RX ORDER — CHOLECALCIFEROL (VITAMIN D3) 125 MCG
5 CAPSULE ORAL NIGHTLY PRN
Status: DISCONTINUED | OUTPATIENT
Start: 2019-07-12 | End: 2019-07-16 | Stop reason: HOSPADM

## 2019-07-12 RX ORDER — HYDROCODONE BITARTRATE AND ACETAMINOPHEN 5; 325 MG/1; MG/1
2 TABLET ORAL EVERY 4 HOURS PRN
Status: DISCONTINUED | OUTPATIENT
Start: 2019-07-12 | End: 2019-07-16 | Stop reason: HOSPADM

## 2019-07-12 RX ORDER — HYDROCODONE BITARTRATE AND ACETAMINOPHEN 5; 325 MG/1; MG/1
1 TABLET ORAL EVERY 4 HOURS PRN
Status: DISCONTINUED | OUTPATIENT
Start: 2019-07-12 | End: 2019-07-16 | Stop reason: HOSPADM

## 2019-07-12 RX ADMIN — HYDROMORPHONE HYDROCHLORIDE 0.5 MG: 1 INJECTION, SOLUTION INTRAMUSCULAR; INTRAVENOUS; SUBCUTANEOUS at 09:51

## 2019-07-12 RX ADMIN — Medication 5 MG: at 21:19

## 2019-07-12 RX ADMIN — HYDROCODONE BITARTRATE AND ACETAMINOPHEN 2 TABLET: 5; 325 TABLET ORAL at 19:48

## 2019-07-12 RX ADMIN — INSULIN GLARGINE 10 UNITS: 100 INJECTION, SOLUTION SUBCUTANEOUS at 21:14

## 2019-07-12 RX ADMIN — ENOXAPARIN SODIUM 40 MG: 40 INJECTION SUBCUTANEOUS at 08:08

## 2019-07-12 RX ADMIN — AMLODIPINE BESYLATE 10 MG: 10 TABLET ORAL at 08:08

## 2019-07-12 NOTE — PROGRESS NOTES
Postoperative day 3 strangulated incisional hernia repair with small bowel resection    Tolerating clears  Afebrile vital signs stable  JOHNNY serous  Abdomen soft and incision healing well    -Advance diet as tolerated

## 2019-07-12 NOTE — PROGRESS NOTES
"                                              LOS: 3 days   Patient Care Team:  Provider, No Known as PCP - General    Chief Complaint:  F/up  diabetes mellitus type 2, hypertension, sleep apnea and medical problems listed below.    Subjective   Interval History  He had a bowel movement.  He continues to experience abdominal pain but he feels better overall.  No nausea or vomiting.  He was not provided with the BiPAP last night.  Reported difficulties sleeping.    REVIEW OF SYSTEMS:     RESPIRATORY: No shortness of breath, cough or sputum.                       Physical Exam:     Vital Signs  Temp:  [97.8 °F (36.6 °C)-98.1 °F (36.7 °C)] 98.1 °F (36.7 °C)  Heart Rate:  [103-113] 111  Resp:  [20] 20  BP: (132-152)/(72-91) 132/91    Intake/Output Summary (Last 24 hours) at 7/12/2019 0122  Last data filed at 7/11/2019 2106  Gross per 24 hour   Intake 1480 ml   Output 320 ml   Net 1160 ml     Flowsheet Rows      First Filed Value   Admission Height  188 cm (74\") Documented at 07/08/2019 2358   Admission Weight  193 kg (426 lb 6.4 oz)  (Abnormal)  Documented at 07/09/2019 0000          General Appearance:    Alert, cooperative, in no acute distress   ENMT:  Mallampati score 3, moist mucous membrane   Eyes: Pupils equals and reactive to light. EOMI   Neck:   Large. Trachea midline. No thyromegaly.   Lungs:     Clear to auscultation,respirations regular, even and                  unlabored    Heart:    Regular rhythm and normal rate, normal S1 and S2, no            murmur   Skin:    No abnormalities observed   Abdomen:     Obese. Soft.  Generalized tenderness around the midline.  No rebound or guarding.  JOHNNY drain noted   Neuro:   Conscious, alert, oriented x3. Strength 5/5 in upper and lower ext   Extremities:   Moves all extremities well, no edema, no cyanosis, no             Redness          Results Review:        Results from last 7 days   Lab Units 07/11/19  0449 07/10/19  0407 07/09/19  0023   SODIUM mmol/L 138 140 138 "   POTASSIUM mmol/L 4.1 4.1 3.5   CHLORIDE mmol/L 100 105 103   CO2 mmol/L 24.7 24.2 19.8*   BUN mg/dL 17 22* 18   CREATININE mg/dL 1.46* 1.83* 1.29*   GLUCOSE mg/dL 202* 190* 231*   CALCIUM mg/dL 9.4 8.2* 9.2     Results from last 7 days   Lab Units 07/11/19  0449   CK TOTAL U/L 265*     Results from last 7 days   Lab Units 07/10/19  0407 07/09/19  0023   WBC 10*3/mm3 7.04 13.34*   HEMOGLOBIN g/dL 10.2* 11.9*   HEMATOCRIT % 33.6* 38.8   PLATELETS 10*3/mm3 205 275               I reviewed the patient's new clinical results.  I personally viewed and interpreted the patient's CXR        Medication Review:     amLODIPine 10 mg Oral Daily   enoxaparin 40 mg Subcutaneous Daily   insulin glargine 10 Units Subcutaneous Nightly   insulin lispro 0-14 Units Subcutaneous 4x Daily With Meals & Nightly         sodium chloride 75 mL/hr Last Rate: 75 mL/hr (07/11/19 2107)       Assessment   1. Strangulated ventral hernia, status post repair with small bowel resection 7/9/2019  2. Acute hypoxic respiratory failure, postop, due to hypoventilation from sleep apnea, pain medications and anesthesia, RESOLVED  3. Obstructive sleep apnea  4. ALEC, improved  5. Hypertensive urgency, resolved  6. Diabetes mellitus type 2  7. Leukocytosis, RESOLVED  8. Mild anemia  9. Incidental Left nephrolithiasis, nonobstructive  10. Hypertension  11. Insomnia      Plan   · Increase Lantus 15 units for hypoglycemia.  Continue insulin sliding scale.  · Auto CPAP at night for sleep apnea. Discussed with the nurse  · Continue IV hydration with normal saline. FeNA 0.1% indicative of volume depletion  · Continue Norvasc for hypertension. BP stable  · DVT prophylaxis with Lovenox  · Melatonin as needed for insomnia  · Upgrade diet per surgery        Kulwinder Reyes MD  07/12/19  1:22 AM            This note was dictated utilizing Dragon dictation

## 2019-07-12 NOTE — PLAN OF CARE
Problem: Patient Care Overview  Goal: Plan of Care Review  Outcome: Ongoing (interventions implemented as appropriate)   07/12/19 1416   Plan of Care Review   Progress no change   OTHER   Outcome Summary Pt has had a restful day. JOHNNY drains with adequate output. Complains of pain but tries not to use too much pain medication - Dilaudid given x1. Uses CPAP at night. Bariatric bed but pt ambulatory. R/A during day and A/O x4. VSS. Blood sugars well controlled without SSI.    Coping/Psychosocial   Plan of Care Reviewed With patient

## 2019-07-13 LAB
ANION GAP SERPL CALCULATED.3IONS-SCNC: 8.6 MMOL/L (ref 5–15)
BUN BLD-MCNC: 14 MG/DL (ref 6–20)
BUN/CREAT SERPL: 12.4 (ref 7–25)
CALCIUM SPEC-SCNC: 8.6 MG/DL (ref 8.6–10.5)
CHLORIDE SERPL-SCNC: 98 MMOL/L (ref 98–107)
CO2 SERPL-SCNC: 26.4 MMOL/L (ref 22–29)
CREAT BLD-MCNC: 1.13 MG/DL (ref 0.76–1.27)
GFR SERPL CREATININE-BSD FRML MDRD: 83 ML/MIN/1.73
GLUCOSE BLD-MCNC: 144 MG/DL (ref 65–99)
GLUCOSE BLDC GLUCOMTR-MCNC: 112 MG/DL (ref 70–130)
GLUCOSE BLDC GLUCOMTR-MCNC: 139 MG/DL (ref 70–130)
GLUCOSE BLDC GLUCOMTR-MCNC: 150 MG/DL (ref 70–130)
GLUCOSE BLDC GLUCOMTR-MCNC: 163 MG/DL (ref 70–130)
POTASSIUM BLD-SCNC: 3.3 MMOL/L (ref 3.5–5.2)
SODIUM BLD-SCNC: 133 MMOL/L (ref 136–145)

## 2019-07-13 PROCEDURE — 82962 GLUCOSE BLOOD TEST: CPT

## 2019-07-13 PROCEDURE — 63710000001 INSULIN GLARGINE PER 5 UNITS: Performed by: INTERNAL MEDICINE

## 2019-07-13 PROCEDURE — 25010000002 ENOXAPARIN PER 10 MG: Performed by: SURGERY

## 2019-07-13 PROCEDURE — 80048 BASIC METABOLIC PNL TOTAL CA: CPT | Performed by: INTERNAL MEDICINE

## 2019-07-13 PROCEDURE — 63710000001 INSULIN LISPRO (HUMAN) PER 5 UNITS: Performed by: SURGERY

## 2019-07-13 RX ORDER — INSULIN GLARGINE 100 [IU]/ML
15 INJECTION, SOLUTION SUBCUTANEOUS NIGHTLY
Status: DISCONTINUED | OUTPATIENT
Start: 2019-07-13 | End: 2019-07-16 | Stop reason: HOSPADM

## 2019-07-13 RX ORDER — POTASSIUM CHLORIDE 1.5 G/1.77G
40 POWDER, FOR SOLUTION ORAL ONCE
Status: COMPLETED | OUTPATIENT
Start: 2019-07-13 | End: 2019-07-13

## 2019-07-13 RX ADMIN — HYDROCODONE BITARTRATE AND ACETAMINOPHEN 2 TABLET: 5; 325 TABLET ORAL at 22:17

## 2019-07-13 RX ADMIN — ENOXAPARIN SODIUM 40 MG: 40 INJECTION SUBCUTANEOUS at 08:34

## 2019-07-13 RX ADMIN — POTASSIUM CHLORIDE 40 MEQ: 1.5 POWDER, FOR SOLUTION ORAL at 13:00

## 2019-07-13 RX ADMIN — METFORMIN HYDROCHLORIDE 1000 MG: 1000 TABLET ORAL at 13:00

## 2019-07-13 RX ADMIN — ENOXAPARIN SODIUM 40 MG: 40 INJECTION SUBCUTANEOUS at 20:11

## 2019-07-13 RX ADMIN — HYDROCODONE BITARTRATE AND ACETAMINOPHEN 2 TABLET: 5; 325 TABLET ORAL at 09:34

## 2019-07-13 RX ADMIN — HYDROCODONE BITARTRATE AND ACETAMINOPHEN 2 TABLET: 5; 325 TABLET ORAL at 18:11

## 2019-07-13 RX ADMIN — AMLODIPINE BESYLATE 10 MG: 10 TABLET ORAL at 08:35

## 2019-07-13 RX ADMIN — HYDROCODONE BITARTRATE AND ACETAMINOPHEN 2 TABLET: 5; 325 TABLET ORAL at 13:24

## 2019-07-13 RX ADMIN — INSULIN GLARGINE 15 UNITS: 100 INJECTION, SOLUTION SUBCUTANEOUS at 21:07

## 2019-07-13 RX ADMIN — INSULIN LISPRO 3 UNITS: 100 INJECTION, SOLUTION INTRAVENOUS; SUBCUTANEOUS at 08:35

## 2019-07-13 RX ADMIN — INSULIN LISPRO 3 UNITS: 100 INJECTION, SOLUTION INTRAVENOUS; SUBCUTANEOUS at 21:07

## 2019-07-13 RX ADMIN — HYDROCODONE BITARTRATE AND ACETAMINOPHEN 2 TABLET: 5; 325 TABLET ORAL at 02:16

## 2019-07-13 NOTE — PLAN OF CARE
Problem: Patient Care Overview  Goal: Plan of Care Review  Outcome: Ongoing (interventions implemented as appropriate)   07/13/19 1633   Plan of Care Review   Progress improving   OTHER   Outcome Summary Pt diet advanced to regular and tolerating well. complaints of pain x2 one with norco as premed for removal of JOHNNY drain. ambulating in the room some. VSS will continue to monitor.    Coping/Psychosocial   Plan of Care Reviewed With patient     Goal: Individualization and Mutuality  Outcome: Ongoing (interventions implemented as appropriate)   07/13/19 1633   Individualization   Patient Specific Preferences goes by nemo   Patient Specific Goals (Include Timeframe) eat regular food   Patient Specific Interventions diet changed to regular   Mutuality/Individual Preferences   What Anxieties, Fears, Concerns, or Questions Do You Have About Your Care? having JOHNNY drain taken out today   What Information Would Help Us Give You More Personalized Care? how to help him get around room   How Would You and/or Your Support Person Like to Participate in Your Care? be active in bedside report   Mutuality/Individual Preferences   How to Address Anxieties/Fears pain medicine given prior to d/c of JOHNNY pain, explain process

## 2019-07-13 NOTE — PROGRESS NOTES
Postoperative day four strangulated incisional hernia repair with small bowel resection    Tolerating full liquids  Afebrile vital signs stable  JOHNNY serous and low output  Abdomen soft and incision healing well    -Advance diet  -Remove 1 of 2 subcutaneous JOHNNY drains  -Increase Lovenox to twice daily secondary to BMI of 52  -Probably home Monday or Tuesday

## 2019-07-13 NOTE — PROGRESS NOTES
"                                              LOS: 4 days   Patient Care Team:  Provider, No Known as PCP - General    Chief Complaint:  F/up  diabetes mellitus type 2, hypertension, sleep apnea and medical problems listed below.    Subjective   Interval History  Tolerating full liquid diet.  He is hungry.  He denies nausea or vomiting.  He had several bowel movements.  He use the BiPAP at night.    Review of systems:  Neurological: He slept well last night.  He did not ask for a sleeping pill.  Respiratory: No cough or shortness of breath.  He is not on oxygen                        Physical Exam:     Vital Signs  Temp:  [97.7 °F (36.5 °C)-98.7 °F (37.1 °C)] 97.7 °F (36.5 °C)  Heart Rate:  [] 84  Resp:  [18-20] 18  BP: (141-156)/(78-96) 141/78    Intake/Output Summary (Last 24 hours) at 7/13/2019 1103  Last data filed at 7/13/2019 0620  Gross per 24 hour   Intake 2415.42 ml   Output 735 ml   Net 1680.42 ml     Flowsheet Rows      First Filed Value   Admission Height  188 cm (74\") Documented at 07/08/2019 2358   Admission Weight  193 kg (426 lb 6.4 oz)  (Abnormal)  Documented at 07/09/2019 0000        General Appearance:    Alert, cooperative, in no acute distress   ENMT:  Mallampati score 3, moist mucous membrane   Eyes: Pupils equals and reactive to light. EOMI   Neck:   Large. Trachea midline. No thyromegaly.   Lungs:     Clear to auscultation,respirations regular, even and                  unlabored    Heart:    Regular rhythm and normal rate, normal S1 and S2, no            murmur   Skin:    No abnormalities observed   Abdomen:     Obese. Soft.  Generalized tenderness around the midline but soft.  No rebound or guarding.  2 JOHNNY drain noted   Neuro:   Conscious, alert, oriented x3. Strength 5/5 in upper and lower ext   Extremities:   Moves all extremities well, no edema, no cyanosis, no             Redness             Results Review:        Results from last 7 days   Lab Units 07/13/19  0631 07/12/19  0624 " 07/11/19  0449   SODIUM mmol/L 133* 141 138   POTASSIUM mmol/L 3.3* 3.7 4.1   CHLORIDE mmol/L 98 104 100   CO2 mmol/L 26.4 24.6 24.7   BUN mg/dL 14 17 17   CREATININE mg/dL 1.13 1.24 1.46*   GLUCOSE mg/dL 144* 157* 202*   CALCIUM mg/dL 8.6 8.5* 9.4     Results from last 7 days   Lab Units 07/11/19  0449   CK TOTAL U/L 265*     Results from last 7 days   Lab Units 07/10/19  0407 07/09/19  0023   WBC 10*3/mm3 7.04 13.34*   HEMOGLOBIN g/dL 10.2* 11.9*   HEMATOCRIT % 33.6* 38.8   PLATELETS 10*3/mm3 205 275               I reviewed the patient's new clinical results.  I personally viewed and interpreted the patient's CXR        Medication Review:     amLODIPine 10 mg Oral Daily   enoxaparin 40 mg Subcutaneous Q12H   insulin glargine 10 Units Subcutaneous Nightly   insulin lispro 0-14 Units Subcutaneous 4x Daily With Meals & Nightly   metFORMIN 1,000 mg Oral BID With Meals            Assessment   1. Strangulated ventral hernia, status post repair with small bowel resection 7/9/2019  2. Acute hypoxic respiratory failure, postop, due to hypoventilation from sleep apnea, pain medications and anesthesia, RESOLVED  3. Obstructive sleep apnea  4. ALEC, improved  5. Hypertensive urgency, resolved  6. Diabetes mellitus type 2  7. Leukocytosis, RESOLVED  8. Mild anemia  9. Incidental Left nephrolithiasis, nonobstructive  10. Hypertension  11. Insomnia  12. Hypokalemia, NEW  13. Hyponatremia, NEW      Plan    · Increase Lantus 15 units for hypoglycemia.  Continue insulin sliding scale.  He is going to eat regular diet now  · Give KCL 40 mq x1. Check BMP in AM  · Auto BiPAP at night for sleep apnea.   · Continue Norvasc for hypertension. BP stable  · DVT prophylaxis with Lovenox  · Melatonin as needed for insomnia  · Upgrade diet per surgery        Kulwinder Reyes MD  07/13/19  11:03 AM            This note was dictated utilizing Dragon dictation

## 2019-07-13 NOTE — PLAN OF CARE
Problem: Patient Care Overview  Goal: Plan of Care Review  Outcome: Ongoing (interventions implemented as appropriate)   07/13/19 2785   Plan of Care Review   Progress improving   OTHER   Outcome Summary Pt had a good night. Minimal JOHNNY output. Had loose BM. Norco is given twice. Ambulated in the room few times. VSS. Continue to monitor.   Coping/Psychosocial   Plan of Care Reviewed With patient       Problem: Pain, Acute (Adult)  Goal: Acceptable Pain Control/Comfort Level  Outcome: Ongoing (interventions implemented as appropriate)      Problem: Skin Injury Risk (Adult)  Goal: Skin Health and Integrity  Outcome: Ongoing (interventions implemented as appropriate)      Problem: Fall Risk (Adult)  Goal: Absence of Fall  Outcome: Ongoing (interventions implemented as appropriate)

## 2019-07-13 NOTE — PROGRESS NOTES
"                                              LOS: 3 days   Patient Care Team:  Provider, No Known as PCP - General    Chief Complaint:  F/up  diabetes mellitus type 2, hypertension, sleep apnea and medical problems listed below.    Subjective   Interval History  Doing well.  Tolerating clear diet                      Physical Exam:     Vital Signs  Temp:  [97.1 °F (36.2 °C)-98.7 °F (37.1 °C)] 97.9 °F (36.6 °C)  Heart Rate:  [] 97  Resp:  [18-20] 18  BP: (133-156)/(85-92) 156/92    Intake/Output Summary (Last 24 hours) at 7/12/2019 2244  Last data filed at 7/12/2019 1959  Gross per 24 hour   Intake 1775.42 ml   Output 845 ml   Net 930.42 ml     Flowsheet Rows      First Filed Value   Admission Height  188 cm (74\") Documented at 07/08/2019 2358   Admission Weight  193 kg (426 lb 6.4 oz)  (Abnormal)  Documented at 07/09/2019 0000          General Appearance:    Alert, cooperative, in no acute distress                                         Results Review:        Results from last 7 days   Lab Units 07/12/19  0624 07/11/19  0449 07/10/19  0407   SODIUM mmol/L 141 138 140   POTASSIUM mmol/L 3.7 4.1 4.1   CHLORIDE mmol/L 104 100 105   CO2 mmol/L 24.6 24.7 24.2   BUN mg/dL 17 17 22*   CREATININE mg/dL 1.24 1.46* 1.83*   GLUCOSE mg/dL 157* 202* 190*   CALCIUM mg/dL 8.5* 9.4 8.2*     Results from last 7 days   Lab Units 07/11/19  0449   CK TOTAL U/L 265*     Results from last 7 days   Lab Units 07/10/19  0407 07/09/19  0023   WBC 10*3/mm3 7.04 13.34*   HEMOGLOBIN g/dL 10.2* 11.9*   HEMATOCRIT % 33.6* 38.8   PLATELETS 10*3/mm3 205 275               I reviewed the patient's new clinical results.  I personally viewed and interpreted the patient's CXR        Medication Review:     amLODIPine 10 mg Oral Daily   enoxaparin 40 mg Subcutaneous Daily   insulin glargine 10 Units Subcutaneous Nightly   insulin lispro 0-14 Units Subcutaneous 4x Daily With Meals & Nightly            Assessment   1. Strangulated ventral hernia, " status post repair with small bowel resection 7/9/2019  2. Acute hypoxic respiratory failure, postop, due to hypoventilation from sleep apnea, pain medications and anesthesia, RESOLVED  3. Obstructive sleep apnea  4. ALEC, improved  5. Hypertensive urgency, resolved  6. Diabetes mellitus type 2  7. Leukocytosis, RESOLVED  8. Mild anemia  9. Incidental Left nephrolithiasis, nonobstructive  10. Hypertension  11. Insomnia      Plan    ·  Lantus 10 units for hypoglycemia.  Continue insulin sliding scale.  · Auto CPAP at night for sleep apnea. Discussed with the nurse  · Continue Norvasc for hypertension. BP stable  · DVT prophylaxis with Lovenox  · Melatonin as needed for insomnia  · Upgrade diet per surgery        Kulwinder Reyes MD  07/12/19  10:44 PM            This note was dictated utilizing Dragon dictation

## 2019-07-14 LAB
ANION GAP SERPL CALCULATED.3IONS-SCNC: 14.3 MMOL/L (ref 5–15)
BUN BLD-MCNC: 11 MG/DL (ref 6–20)
BUN/CREAT SERPL: 10.5 (ref 7–25)
CALCIUM SPEC-SCNC: 8.5 MG/DL (ref 8.6–10.5)
CHLORIDE SERPL-SCNC: 97 MMOL/L (ref 98–107)
CO2 SERPL-SCNC: 24.7 MMOL/L (ref 22–29)
CREAT BLD-MCNC: 1.05 MG/DL (ref 0.76–1.27)
GFR SERPL CREATININE-BSD FRML MDRD: 90 ML/MIN/1.73
GLUCOSE BLD-MCNC: 129 MG/DL (ref 65–99)
GLUCOSE BLDC GLUCOMTR-MCNC: 122 MG/DL (ref 70–130)
GLUCOSE BLDC GLUCOMTR-MCNC: 124 MG/DL (ref 70–130)
GLUCOSE BLDC GLUCOMTR-MCNC: 131 MG/DL (ref 70–130)
GLUCOSE BLDC GLUCOMTR-MCNC: 164 MG/DL (ref 70–130)
POTASSIUM BLD-SCNC: 3.2 MMOL/L (ref 3.5–5.2)
POTASSIUM BLD-SCNC: 3.6 MMOL/L (ref 3.5–5.2)
SODIUM BLD-SCNC: 136 MMOL/L (ref 136–145)

## 2019-07-14 PROCEDURE — 63710000001 INSULIN GLARGINE PER 5 UNITS: Performed by: INTERNAL MEDICINE

## 2019-07-14 PROCEDURE — 94799 UNLISTED PULMONARY SVC/PX: CPT

## 2019-07-14 PROCEDURE — 63710000001 INSULIN LISPRO (HUMAN) PER 5 UNITS: Performed by: SURGERY

## 2019-07-14 PROCEDURE — 84132 ASSAY OF SERUM POTASSIUM: CPT | Performed by: SURGERY

## 2019-07-14 PROCEDURE — 82962 GLUCOSE BLOOD TEST: CPT

## 2019-07-14 PROCEDURE — 99024 POSTOP FOLLOW-UP VISIT: CPT | Performed by: SURGERY

## 2019-07-14 PROCEDURE — 80048 BASIC METABOLIC PNL TOTAL CA: CPT | Performed by: INTERNAL MEDICINE

## 2019-07-14 PROCEDURE — 25010000002 ENOXAPARIN PER 10 MG: Performed by: SURGERY

## 2019-07-14 RX ORDER — POTASSIUM CHLORIDE 7.45 MG/ML
10 INJECTION INTRAVENOUS
Status: DISCONTINUED | OUTPATIENT
Start: 2019-07-14 | End: 2019-07-16 | Stop reason: HOSPADM

## 2019-07-14 RX ORDER — POTASSIUM CHLORIDE 1.5 G/1.77G
40 POWDER, FOR SOLUTION ORAL AS NEEDED
Status: DISCONTINUED | OUTPATIENT
Start: 2019-07-14 | End: 2019-07-16 | Stop reason: HOSPADM

## 2019-07-14 RX ORDER — POTASSIUM CHLORIDE 750 MG/1
40 CAPSULE, EXTENDED RELEASE ORAL AS NEEDED
Status: DISCONTINUED | OUTPATIENT
Start: 2019-07-14 | End: 2019-07-16 | Stop reason: HOSPADM

## 2019-07-14 RX ADMIN — ENOXAPARIN SODIUM 40 MG: 40 INJECTION SUBCUTANEOUS at 21:16

## 2019-07-14 RX ADMIN — POTASSIUM CHLORIDE 40 MEQ: 750 CAPSULE, EXTENDED RELEASE ORAL at 12:06

## 2019-07-14 RX ADMIN — HYDROCODONE BITARTRATE AND ACETAMINOPHEN 2 TABLET: 5; 325 TABLET ORAL at 17:54

## 2019-07-14 RX ADMIN — HYDROCODONE BITARTRATE AND ACETAMINOPHEN 2 TABLET: 5; 325 TABLET ORAL at 23:24

## 2019-07-14 RX ADMIN — POTASSIUM CHLORIDE 40 MEQ: 750 CAPSULE, EXTENDED RELEASE ORAL at 15:57

## 2019-07-14 RX ADMIN — INSULIN LISPRO 3 UNITS: 100 INJECTION, SOLUTION INTRAVENOUS; SUBCUTANEOUS at 21:16

## 2019-07-14 RX ADMIN — HYDROCODONE BITARTRATE AND ACETAMINOPHEN 2 TABLET: 5; 325 TABLET ORAL at 08:24

## 2019-07-14 RX ADMIN — METFORMIN HYDROCHLORIDE 1000 MG: 1000 TABLET ORAL at 08:24

## 2019-07-14 RX ADMIN — METFORMIN HYDROCHLORIDE 1000 MG: 1000 TABLET ORAL at 17:54

## 2019-07-14 RX ADMIN — INSULIN GLARGINE 15 UNITS: 100 INJECTION, SOLUTION SUBCUTANEOUS at 21:16

## 2019-07-14 RX ADMIN — AMLODIPINE BESYLATE 10 MG: 10 TABLET ORAL at 08:24

## 2019-07-14 RX ADMIN — ENOXAPARIN SODIUM 40 MG: 40 INJECTION SUBCUTANEOUS at 08:25

## 2019-07-14 RX ADMIN — Medication 5 MG: at 23:24

## 2019-07-14 NOTE — PROGRESS NOTES
"                                              LOS: 5 days   Patient Care Team:  Provider, No Known as PCP - General    Chief Complaint:  F/up  diabetes mellitus type 2, hypertension, sleep apnea and medical problems listed below.    Subjective   Interval History  Had one JOHNNY drain removed yesterday.  Currently on solid diet and he is tolerating well.  He used the BiPAP last night and did not have any trouble sleeping.    Review of systems:    Respiratory: No cough or shortness of breath.  He is not on oxygen                        Physical Exam:     Vital Signs  Temp:  [96.9 °F (36.1 °C)-97.6 °F (36.4 °C)] 97.4 °F (36.3 °C)  Heart Rate:  [82-89] 82  Resp:  [16-18] 18  BP: (129-160)/(80-99) 132/80    Intake/Output Summary (Last 24 hours) at 7/14/2019 1047  Last data filed at 7/14/2019 0755  Gross per 24 hour   Intake --   Output 615 ml   Net -615 ml     Flowsheet Rows      First Filed Value   Admission Height  188 cm (74\") Documented at 07/08/2019 2358   Admission Weight  193 kg (426 lb 6.4 oz)  (Abnormal)  Documented at 07/09/2019 0000        General Appearance:    Alert, cooperative, in no acute distress   ENMT:  Mallampati score 3, moist mucous membrane   Eyes: Pupils equals and reactive to light. EOMI   Neck:   Large. Trachea midline. No thyromegaly.   Lungs:     Clear to auscultation,respirations regular, even and                  unlabored    Heart:    Regular rhythm and normal rate, normal S1 and S2, no            murmur   Skin:    No abnormalities observed   Abdomen:     Obese. Soft.  Generalized tenderness around the midline but soft.  No rebound or guarding.  1 JOHNNY drain noted   Neuro:   Conscious, alert, oriented x3. Strength 5/5 in upper and lower ext   Extremities:   Moves all extremities well, no edema, no cyanosis, no             Redness             Results Review:        Results from last 7 days   Lab Units 07/14/19  0735 07/13/19  0631 07/12/19  0624   SODIUM mmol/L 136 133* 141   POTASSIUM mmol/L 3.2* " 3.3* 3.7   CHLORIDE mmol/L 97* 98 104   CO2 mmol/L 24.7 26.4 24.6   BUN mg/dL 11 14 17   CREATININE mg/dL 1.05 1.13 1.24   GLUCOSE mg/dL 129* 144* 157*   CALCIUM mg/dL 8.5* 8.6 8.5*     Results from last 7 days   Lab Units 07/11/19  0449   CK TOTAL U/L 265*     Results from last 7 days   Lab Units 07/10/19  0407 07/09/19  0023   WBC 10*3/mm3 7.04 13.34*   HEMOGLOBIN g/dL 10.2* 11.9*   HEMATOCRIT % 33.6* 38.8   PLATELETS 10*3/mm3 205 275               I reviewed the patient's new clinical results.  I personally viewed and interpreted the patient's CXR        Medication Review:     amLODIPine 10 mg Oral Daily   enoxaparin 40 mg Subcutaneous Q12H   insulin glargine 15 Units Subcutaneous Nightly   insulin lispro 0-14 Units Subcutaneous 4x Daily With Meals & Nightly   metFORMIN 1,000 mg Oral BID With Meals            Assessment   1. Strangulated ventral hernia, status post repair with small bowel resection 7/9/2019  2. Acute hypoxic respiratory failure, postop, due to hypoventilation from sleep apnea, pain medications and anesthesia, RESOLVED  3. Obstructive sleep apnea  4. ALEC, improved  5. Hypertensive urgency, resolved  6. Diabetes mellitus type 2  7. Leukocytosis, RESOLVED  8. Mild anemia  9. Incidental Left nephrolithiasis, nonobstructive  10. Hypertension  11. Insomnia  12. Hypokalemia, NEW        Plan    · Continue Lantus 15 units for hypoglycemia.  Continue insulin sliding scale.    · Replace potassium. Check BMP in AM  · Auto BiPAP at night for sleep apnea.   · Continue Norvasc for hypertension. BP stable  · DVT prophylaxis with Lovenox  · Melatonin as needed for insomnia          Kulwinder Reyes MD  07/14/19  10:47 AM            This note was dictated utilizing Dragon dictation

## 2019-07-14 NOTE — PROGRESS NOTES
Postoperative day 5 strangulated incisional hernia repair with small bowel resection    Tolerating regular diet  Afebrile vital signs stable  JOHNNY serous and low output  Abdomen soft and incision healing well  K low    -K protocol  -anticipate discharge tomorrow with drain removal prior to discharge.

## 2019-07-14 NOTE — PLAN OF CARE
Problem: Patient Care Overview  Goal: Plan of Care Review  Outcome: Ongoing (interventions implemented as appropriate)   07/14/19 0549   Plan of Care Review   Progress improving   OTHER   Outcome Summary Pt had uneventful night. Norco is given once. Up ad constance. Minimal JOHNNY output. Continue to monitor.   Coping/Psychosocial   Plan of Care Reviewed With patient       Problem: Pain, Acute (Adult)  Goal: Acceptable Pain Control/Comfort Level  Outcome: Ongoing (interventions implemented as appropriate)      Problem: Skin Injury Risk (Adult)  Goal: Skin Health and Integrity  Outcome: Ongoing (interventions implemented as appropriate)      Problem: Fall Risk (Adult)  Goal: Absence of Fall  Outcome: Ongoing (interventions implemented as appropriate)

## 2019-07-14 NOTE — PLAN OF CARE
Problem: Patient Care Overview  Goal: Plan of Care Review  Outcome: Ongoing (interventions implemented as appropriate)   07/14/19 2418   Plan of Care Review   Progress improving   OTHER   Outcome Summary potassium replaced orally for reading of 3.2. recheck ordered for 2000. norco given x1 for pain. plan is for dc home tomorrow.    Coping/Psychosocial   Plan of Care Reviewed With patient     Goal: Individualization and Mutuality  Outcome: Ongoing (interventions implemented as appropriate)    Goal: Discharge Needs Assessment  Outcome: Ongoing (interventions implemented as appropriate)    Goal: Interprofessional Rounds/Family Conf  Outcome: Ongoing (interventions implemented as appropriate)      Problem: Pain, Acute (Adult)  Goal: Acceptable Pain Control/Comfort Level  Outcome: Ongoing (interventions implemented as appropriate)      Problem: Skin Injury Risk (Adult)  Goal: Skin Health and Integrity  Outcome: Ongoing (interventions implemented as appropriate)      Problem: Fall Risk (Adult)  Goal: Absence of Fall  Outcome: Ongoing (interventions implemented as appropriate)

## 2019-07-15 LAB
ANION GAP SERPL CALCULATED.3IONS-SCNC: 11.1 MMOL/L (ref 5–15)
BUN BLD-MCNC: 11 MG/DL (ref 6–20)
BUN/CREAT SERPL: 9.5 (ref 7–25)
CALCIUM SPEC-SCNC: 8.8 MG/DL (ref 8.6–10.5)
CHLORIDE SERPL-SCNC: 98 MMOL/L (ref 98–107)
CO2 SERPL-SCNC: 25.9 MMOL/L (ref 22–29)
CREAT BLD-MCNC: 1.16 MG/DL (ref 0.76–1.27)
GFR SERPL CREATININE-BSD FRML MDRD: 80 ML/MIN/1.73
GLUCOSE BLD-MCNC: 147 MG/DL (ref 65–99)
GLUCOSE BLDC GLUCOMTR-MCNC: 120 MG/DL (ref 70–130)
GLUCOSE BLDC GLUCOMTR-MCNC: 123 MG/DL (ref 70–130)
GLUCOSE BLDC GLUCOMTR-MCNC: 133 MG/DL (ref 70–130)
GLUCOSE BLDC GLUCOMTR-MCNC: 158 MG/DL (ref 70–130)
POTASSIUM BLD-SCNC: 3.5 MMOL/L (ref 3.5–5.2)
SODIUM BLD-SCNC: 135 MMOL/L (ref 136–145)

## 2019-07-15 PROCEDURE — 63710000001 INSULIN GLARGINE PER 5 UNITS: Performed by: INTERNAL MEDICINE

## 2019-07-15 PROCEDURE — 80048 BASIC METABOLIC PNL TOTAL CA: CPT | Performed by: INTERNAL MEDICINE

## 2019-07-15 PROCEDURE — 25010000002 ENOXAPARIN PER 10 MG: Performed by: SURGERY

## 2019-07-15 PROCEDURE — 63710000001 INSULIN LISPRO (HUMAN) PER 5 UNITS: Performed by: SURGERY

## 2019-07-15 PROCEDURE — 82962 GLUCOSE BLOOD TEST: CPT

## 2019-07-15 RX ADMIN — HYDROCODONE BITARTRATE AND ACETAMINOPHEN 2 TABLET: 5; 325 TABLET ORAL at 09:23

## 2019-07-15 RX ADMIN — HYDROCODONE BITARTRATE AND ACETAMINOPHEN 2 TABLET: 5; 325 TABLET ORAL at 21:32

## 2019-07-15 RX ADMIN — INSULIN LISPRO 3 UNITS: 100 INJECTION, SOLUTION INTRAVENOUS; SUBCUTANEOUS at 09:23

## 2019-07-15 RX ADMIN — INSULIN GLARGINE 15 UNITS: 100 INJECTION, SOLUTION SUBCUTANEOUS at 21:32

## 2019-07-15 RX ADMIN — METFORMIN HYDROCHLORIDE 1000 MG: 1000 TABLET ORAL at 17:39

## 2019-07-15 RX ADMIN — AMLODIPINE BESYLATE 10 MG: 10 TABLET ORAL at 09:23

## 2019-07-15 RX ADMIN — ENOXAPARIN SODIUM 40 MG: 40 INJECTION SUBCUTANEOUS at 21:37

## 2019-07-15 RX ADMIN — ENOXAPARIN SODIUM 40 MG: 40 INJECTION SUBCUTANEOUS at 09:23

## 2019-07-15 RX ADMIN — HYDROCODONE BITARTRATE AND ACETAMINOPHEN 2 TABLET: 5; 325 TABLET ORAL at 16:59

## 2019-07-15 RX ADMIN — METFORMIN HYDROCHLORIDE 1000 MG: 1000 TABLET ORAL at 09:23

## 2019-07-15 NOTE — PLAN OF CARE
Problem: Patient Care Overview  Goal: Plan of Care Review  Outcome: Ongoing (interventions implemented as appropriate)   07/15/19 0552   Plan of Care Review   Progress improving   OTHER   Outcome Summary VSS. Ad constance. A&Ox4. K Protocol recheck 3.6. Midline incision ADELINA with staples. L JOHNNY. Urinal at bedside. ACHS with lantus & SSI. Melatonin x 1 given at bedtime. Lortab x 1 given for abdominal pain. Sx note states likely d/c today however patient states he was told Tuesday. Cont to monitor.   Coping/Psychosocial   Plan of Care Reviewed With patient

## 2019-07-15 NOTE — PLAN OF CARE
Problem: Patient Care Overview  Goal: Plan of Care Review  Outcome: Outcome(s) achieved Date Met: 07/15/19   07/15/19 1501   Plan of Care Review   Progress improving   OTHER   Outcome Summary VSS. Pulled pt other JOHNNY drain. Midline insicion still has staple. Clean and dry. Pt uses urinal. Possible d/c tomorrow. Pt has to have a ride set up. Will continue to monitor pt.    Coping/Psychosocial   Plan of Care Reviewed With patient

## 2019-07-15 NOTE — PROGRESS NOTES
Postoperative day 5 strangulated incisional hernia repair with small bowel resection    Tolerating regular diet  Afebrile vital signs stable  JOHNNY drain output was relatively low but when evaluating him the grenade had fallen off and he was not sure when or where it had gone and I am concerned that it may be more source of contamination than doing any good at this point and therefore we will remove the JOHNNY drain today.    Abdomen is soft and incision is healing nicely    Overall he is doing well and plan will be likely discharge tomorrow

## 2019-07-15 NOTE — PROGRESS NOTES
"                                              LOS: 6 days   Patient Care Team:  Provider, No Known as PCP - General    Chief Complaint:  F/up  diabetes mellitus type 2, hypertension, sleep apnea and medical problems listed below.    Subjective   Interval History  Doing well.  Tolerating p.o. intake.  JOHNNY drain was removed.  He used his BiPAP last night                        Physical Exam:     Vital Signs  Temp:  [97.3 °F (36.3 °C)-98.9 °F (37.2 °C)] 97.4 °F (36.3 °C)  Heart Rate:  [75-89] 88  Resp:  [18] 18  BP: (127-153)/(66-94) 127/80    Intake/Output Summary (Last 24 hours) at 7/15/2019 1601  Last data filed at 7/15/2019 0654  Gross per 24 hour   Intake 360 ml   Output 1210 ml   Net -850 ml     Flowsheet Rows      First Filed Value   Admission Height  188 cm (74\") Documented at 07/08/2019 2358   Admission Weight  193 kg (426 lb 6.4 oz)  (Abnormal)  Documented at 07/09/2019 0000        General Appearance:    Alert, cooperative, in no acute distress                                                 Results Review:        Results from last 7 days   Lab Units 07/15/19  0808 07/14/19 2001 07/14/19  0735 07/13/19  0631   SODIUM mmol/L 135*  --  136 133*   POTASSIUM mmol/L 3.5 3.6 3.2* 3.3*   CHLORIDE mmol/L 98  --  97* 98   CO2 mmol/L 25.9  --  24.7 26.4   BUN mg/dL 11  --  11 14   CREATININE mg/dL 1.16  --  1.05 1.13   GLUCOSE mg/dL 147*  --  129* 144*   CALCIUM mg/dL 8.8  --  8.5* 8.6     Results from last 7 days   Lab Units 07/11/19  0449   CK TOTAL U/L 265*     Results from last 7 days   Lab Units 07/10/19  0407 07/09/19  0023   WBC 10*3/mm3 7.04 13.34*   HEMOGLOBIN g/dL 10.2* 11.9*   HEMATOCRIT % 33.6* 38.8   PLATELETS 10*3/mm3 205 275               I reviewed the patient's new clinical results.  I personally viewed and interpreted the patient's CXR        Medication Review:     amLODIPine 10 mg Oral Daily   enoxaparin 40 mg Subcutaneous Q12H   insulin glargine 15 Units Subcutaneous Nightly   insulin lispro 0-14 " Units Subcutaneous 4x Daily With Meals & Nightly   metFORMIN 1,000 mg Oral BID With Meals            Assessment   1. Strangulated ventral hernia, status post repair with small bowel resection 7/9/2019  2. Acute hypoxic respiratory failure, postop, due to hypoventilation from sleep apnea, pain medications and anesthesia, RESOLVED  3. Obstructive sleep apnea  4. ALEC, improved  5. Hypertensive urgency, resolved  6. Diabetes mellitus type 2  7. Leukocytosis, RESOLVED  8. Mild anemia  9. Incidental Left nephrolithiasis, nonobstructive  10. Hypertension  11. Insomnia  12. Hypokalemia, NEW        Plan    · Continue Lantus 15 units for hypoglycemia.  Continue insulin sliding scale.  Lantus can be stopped and oral hypoglycemic agent can be resumed as outpatient  · Auto BiPAP at night for sleep apnea While inpatient.  He has a CPAP machine which she can use when his discharged  · Continue Norvasc for hypertension. BP stable  · DVT prophylaxis with Lovenox  · Melatonin as needed for insomnia          Kulwinder Reyes MD  07/15/19  4:01 PM            This note was dictated utilizing Dragon dictation

## 2019-07-16 ENCOUNTER — TELEPHONE (OUTPATIENT)
Dept: SURGERY | Facility: CLINIC | Age: 54
End: 2019-07-16

## 2019-07-16 VITALS
TEMPERATURE: 98.7 F | WEIGHT: 315 LBS | HEIGHT: 74 IN | OXYGEN SATURATION: 97 % | HEART RATE: 85 BPM | DIASTOLIC BLOOD PRESSURE: 85 MMHG | RESPIRATION RATE: 18 BRPM | SYSTOLIC BLOOD PRESSURE: 147 MMHG | BODY MASS INDEX: 40.43 KG/M2

## 2019-07-16 LAB — GLUCOSE BLDC GLUCOMTR-MCNC: 131 MG/DL (ref 70–130)

## 2019-07-16 PROCEDURE — 82962 GLUCOSE BLOOD TEST: CPT

## 2019-07-16 RX ORDER — HYDROCODONE BITARTRATE AND ACETAMINOPHEN 5; 325 MG/1; MG/1
1-2 TABLET ORAL EVERY 4 HOURS PRN
Qty: 30 TABLET | Refills: 0 | Status: SHIPPED | OUTPATIENT
Start: 2019-07-16

## 2019-07-16 RX ADMIN — AMLODIPINE BESYLATE 10 MG: 10 TABLET ORAL at 08:34

## 2019-07-16 RX ADMIN — METFORMIN HYDROCHLORIDE 1000 MG: 1000 TABLET ORAL at 08:34

## 2019-07-16 NOTE — NURSING NOTE
Pt family called needing a letter stating the pt is safe to fly. Called Dr. Mckinnon's office and they said they would take care of it.

## 2019-07-16 NOTE — PLAN OF CARE
Problem: Patient Care Overview  Goal: Plan of Care Review  Outcome: Ongoing (interventions implemented as appropriate)   07/16/19 4373   Plan of Care Review   Progress improving   OTHER   Outcome Summary VSS. Ad constance. A&Ox4. Midline incision ADELINA C/D/I. Prev L JOHNNY site gauze with transparent dressing. Urinal at bedside. Lortab x 1 given at HS. Possible d/c 07/16. Per end of shift report, pt needs to arrange ride back home. Per CCP note patient from [facility].   Coping/Psychosocial   Plan of Care Reviewed With patient

## 2019-07-16 NOTE — PROGRESS NOTES
Case Management Discharge Note    Final Note: [facility]. Arranged [employer] transport to airport himself. States his job/company paid for flight to return to [place]. Medications filled per [facility] retail pharmacy. No additional needs noted.     Destination      No service has been selected for the patient.      Durable Medical Equipment      No service has been selected for the patient.      Dialysis/Infusion      No service has been selected for the patient.      Home Medical Care      No service has been selected for the patient.      Therapy      No service has been selected for the patient.      Community Resources      No service has been selected for the patient.             Final Discharge Disposition Code: 01 - home or self-care

## 2019-07-16 NOTE — TELEPHONE ENCOUNTER
Pt advised and requested that I fax the letter to [phone] and include flight record  # [id number].

## 2019-07-17 ENCOUNTER — READMISSION MANAGEMENT (OUTPATIENT)
Dept: CALL CENTER | Facility: HOSPITAL | Age: 54
End: 2019-07-17

## 2019-07-17 NOTE — OUTREACH NOTE
Prep Survey      Responses   Facility patient discharged from?  [facility]   Is patient eligible?  Yes   Discharge diagnosis  open stragulated ventral hernia repair with mesh and small bowel resection   Does the patient have one of the following disease processes/diagnoses(primary or secondary)?  General Surgery   Does the patient have Home health ordered?  No   Is there a DME ordered?  No   Prep survey completed?  Yes          Nesha Mulligan RN

## 2019-07-18 ENCOUNTER — READMISSION MANAGEMENT (OUTPATIENT)
Dept: CALL CENTER | Facility: HOSPITAL | Age: 54
End: 2019-07-18

## 2019-07-18 NOTE — DISCHARGE SUMMARY
DATE OF ADMIT: 7/9/2019    DATE OF DISCHARGE: 7/16/2019    DIAGNOSIS: Strangulated ventral hernia    FINAL PATHOLOGY: Strangulated small bowel and benign hernia sac    PROCEDURES: Open strangulated ventral hernia repair with small bowel resection 7/9/2019    SUMMARY OF HOSPITAL COURSE:   Presented to the hospital with severe abdominal pain and incarcerated ventral hernia, underwent emergency surgery with strangulated small bowel noted at time of exploration.  Uneventful postop course. Tolerating diet, incisions in good order and afebrile with stable vital signs at discharge. Prescribed hydrocodone for pain.    DIET: Regular    ACTIVITY: Walking encouraged, no lifting or strenuous activity    MEDICATIONS: Refer to MAR    FOLLOW-UP: To call office and schedule 1 week follow-up appointment    Andrea Mckinnon M.D.

## 2019-07-18 NOTE — OUTREACH NOTE
General Surgery Week 1 Survey      Responses   Facility patient discharged from?  [facility]   Does the patient have one of the following disease processes/diagnoses(primary or secondary)?  General Surgery   Is there a successful TCM telephone encounter documented?  No   Week 1 attempt successful?  No   Unsuccessful attempts  Attempt 1          Violeta Chirinos RN

## 2019-07-19 ENCOUNTER — READMISSION MANAGEMENT (OUTPATIENT)
Dept: CALL CENTER | Facility: HOSPITAL | Age: 54
End: 2019-07-19

## 2019-07-19 NOTE — OUTREACH NOTE
General Surgery Week 1 Survey      Responses   Facility patient discharged from?  [place] [facility]   Does the patient have one of the following disease processes/diagnoses(primary or secondary)?  General Surgery   Is there a successful TCM telephone encounter documented?  No   Week 1 attempt successful?  No   Unsuccessful attempts  Attempt 2          Baljit Wright RN

## 2019-07-20 ENCOUNTER — READMISSION MANAGEMENT (OUTPATIENT)
Dept: CALL CENTER | Facility: HOSPITAL | Age: 54
End: 2019-07-20

## 2019-07-20 NOTE — OUTREACH NOTE
General Surgery Week 1 Survey      Responses   Facility patient discharged from?  [facility]   Does the patient have one of the following disease processes/diagnoses(primary or secondary)?  General Surgery   Is there a successful TCM telephone encounter documented?  No   Week 1 attempt successful?  No   Unsuccessful attempts  Attempt 3          Kelsea Null RN

## 2019-07-21 ENCOUNTER — READMISSION MANAGEMENT (OUTPATIENT)
Dept: CALL CENTER | Facility: HOSPITAL | Age: 54
End: 2019-07-21

## 2019-07-21 NOTE — OUTREACH NOTE
General Surgery Week 1 Survey      Responses   Facility patient discharged from?  [facility]   Does the patient have one of the following disease processes/diagnoses(primary or secondary)?  General Surgery   Is there a successful TCM telephone encounter documented?  No   Week 1 attempt successful?  No   Unsuccessful attempts  Attempt 4          Kelsea Null RN

## 2019-07-29 ENCOUNTER — READMISSION MANAGEMENT (OUTPATIENT)
Dept: CALL CENTER | Facility: HOSPITAL | Age: 54
End: 2019-07-29

## 2019-07-29 NOTE — OUTREACH NOTE
General Surgery Week 2 Survey      Responses   Facility patient discharged from?  [facility]   Does the patient have one of the following disease processes/diagnoses(primary or secondary)?  General Surgery   Week 2 attempt successful?  No   Unsuccessful attempts  Attempt 1          Judy Hollis RN

## 2019-07-30 ENCOUNTER — READMISSION MANAGEMENT (OUTPATIENT)
Dept: CALL CENTER | Facility: HOSPITAL | Age: 54
End: 2019-07-30

## 2019-07-30 NOTE — OUTREACH NOTE
General Surgery Week 2 Survey      Responses   Facility patient discharged from?  [facility]   Does the patient have one of the following disease processes/diagnoses(primary or secondary)?  General Surgery   Week 2 attempt successful?  No   Unsuccessful attempts  Attempt 2          Airam Howell RN

## (undated) DEVICE — STPLR SKIN VISISTAT WD 35CT

## (undated) DEVICE — ENSEAL TEMPERATURE CONTROLLED TISSUE SEALING TECHNOLOGY DISPOSABLE TISSUE SEALING DEVICE TAPTRONIC TRIGGER ACTIVATED POWER 5MM JAW STYLE: Brand: ENSEAL

## (undated) DEVICE — 3M™ MEDIPORE™ H SOFT CLOTH SURGICAL TAPE 2862, 2 INCH X 10 YARD (5CM X 9,1M), 12 ROLLS/CASE: Brand: 3M™ MEDIPORE™

## (undated) DEVICE — GLV SURG PREMIERPRO ORTHO LTX PF SZ7.5 BRN

## (undated) DEVICE — Device

## (undated) DEVICE — DRSNG WND BORDR/ADHS NONADHR/GZ LF 4X14IN STRL

## (undated) DEVICE — LARGE VISCERA RETAINER: Brand: VISCERA RETAINER, FISH

## (undated) DEVICE — APPL CHLORAPREP W/TINT 26ML ORNG

## (undated) DEVICE — ELECTRD BLD EDGE/INSUL1P 2.4X5.1MM STRL

## (undated) DEVICE — SUT VIC 3/0 TIES 18IN J110T

## (undated) DEVICE — PK PROC MAJ 40

## (undated) DEVICE — SPNG GZ WOVN 4X4IN 12PLY 10/BX STRL

## (undated) DEVICE — NDL HYPO ECLPS SFTY 22G 1 1/2IN

## (undated) DEVICE — TOTAL TRAY, 16FR 10ML SIL FOLEY, URN: Brand: MEDLINE

## (undated) DEVICE — GOWN ,SIRUS,NONREINFORCED SMALL: Brand: MEDLINE

## (undated) DEVICE — JACKSON-PRATT 100CC BULB RESERVOIR: Brand: CARDINAL HEALTH

## (undated) DEVICE — SUT NUROLON 0 CT1 CR8 18IN C521D

## (undated) DEVICE — GLV SURG BIOGEL LTX PF 6

## (undated) DEVICE — SINGLE BASIN: Brand: CARDINAL HEALTH

## (undated) DEVICE — VIOLET BRAIDED (POLYGLACTIN 910), SYNTHETIC ABSORBABLE SUTURE: Brand: COATED VICRYL